# Patient Record
Sex: FEMALE | Race: OTHER | NOT HISPANIC OR LATINO
[De-identification: names, ages, dates, MRNs, and addresses within clinical notes are randomized per-mention and may not be internally consistent; named-entity substitution may affect disease eponyms.]

---

## 2022-01-27 ENCOUNTER — RESULT REVIEW (OUTPATIENT)
Age: 27
End: 2022-01-27

## 2022-02-24 ENCOUNTER — NON-APPOINTMENT (OUTPATIENT)
Age: 27
End: 2022-02-24

## 2022-02-24 PROBLEM — Z00.00 ENCOUNTER FOR PREVENTIVE HEALTH EXAMINATION: Status: ACTIVE | Noted: 2022-02-24

## 2022-03-01 ENCOUNTER — NON-APPOINTMENT (OUTPATIENT)
Age: 27
End: 2022-03-01

## 2022-03-01 ENCOUNTER — APPOINTMENT (OUTPATIENT)
Dept: OBGYN | Facility: CLINIC | Age: 27
End: 2022-03-01
Payer: COMMERCIAL

## 2022-03-01 VITALS — TEMPERATURE: 98 F | WEIGHT: 132 LBS | BODY MASS INDEX: 22.53 KG/M2 | HEIGHT: 64 IN

## 2022-03-01 PROCEDURE — 99203 OFFICE O/P NEW LOW 30 MIN: CPT | Mod: 25

## 2022-03-01 PROCEDURE — 76830 TRANSVAGINAL US NON-OB: CPT

## 2022-03-02 LAB
BILIRUB UR QL STRIP: NEGATIVE
GLUCOSE UR-MCNC: NEGATIVE
HCG UR QL: 0.2 EU/DL
HGB UR QL STRIP.AUTO: NEGATIVE
KETONES UR-MCNC: 15
LEUKOCYTE ESTERASE UR QL STRIP: NORMAL
NITRITE UR QL STRIP: NEGATIVE
PH UR STRIP: 5.5
PROT UR STRIP-MCNC: NORMAL
SP GR UR STRIP: 1.03

## 2022-03-03 ENCOUNTER — LABORATORY RESULT (OUTPATIENT)
Age: 27
End: 2022-03-03

## 2022-03-08 RX ORDER — PROGESTERONE 100 MG/1
100 CAPSULE ORAL AT BEDTIME
Qty: 30 | Refills: 2 | Status: ACTIVE | COMMUNITY
Start: 2022-03-08 | End: 1900-01-01

## 2022-03-10 ENCOUNTER — NON-APPOINTMENT (OUTPATIENT)
Age: 27
End: 2022-03-10

## 2022-03-14 ENCOUNTER — APPOINTMENT (OUTPATIENT)
Dept: OBGYN | Facility: CLINIC | Age: 27
End: 2022-03-14
Payer: COMMERCIAL

## 2022-03-14 ENCOUNTER — NON-APPOINTMENT (OUTPATIENT)
Age: 27
End: 2022-03-14

## 2022-03-14 VITALS — BODY MASS INDEX: 22.71 KG/M2 | WEIGHT: 133 LBS | TEMPERATURE: 98 F | HEIGHT: 64 IN

## 2022-03-14 DIAGNOSIS — Z78.9 OTHER SPECIFIED HEALTH STATUS: ICD-10-CM

## 2022-03-14 DIAGNOSIS — N91.1 SECONDARY AMENORRHEA: ICD-10-CM

## 2022-03-14 LAB
BILIRUB UR QL STRIP: NEGATIVE
GLUCOSE UR-MCNC: NEGATIVE
HCG UR QL: 0.2 EU/DL
HGB UR QL STRIP.AUTO: NEGATIVE
KETONES UR-MCNC: NEGATIVE
LEUKOCYTE ESTERASE UR QL STRIP: NEGATIVE
NITRITE UR QL STRIP: NEGATIVE
PH UR STRIP: 7
PROT UR STRIP-MCNC: NEGATIVE
SP GR UR STRIP: 1.02

## 2022-03-14 PROCEDURE — 0502F SUBSEQUENT PRENATAL CARE: CPT

## 2022-03-14 RX ORDER — PRENATAL VIT 49/IRON FUM/FOLIC 6.75-0.2MG
TABLET ORAL
Refills: 0 | Status: ACTIVE | COMMUNITY

## 2022-03-28 ENCOUNTER — APPOINTMENT (OUTPATIENT)
Dept: ANTEPARTUM | Facility: CLINIC | Age: 27
End: 2022-03-28
Payer: COMMERCIAL

## 2022-03-28 ENCOUNTER — ASOB RESULT (OUTPATIENT)
Age: 27
End: 2022-03-28

## 2022-03-28 ENCOUNTER — RESULT CHARGE (OUTPATIENT)
Age: 27
End: 2022-03-28

## 2022-03-28 ENCOUNTER — OUTPATIENT (OUTPATIENT)
Dept: OUTPATIENT SERVICES | Facility: HOSPITAL | Age: 27
LOS: 1 days | Discharge: HOME | End: 2022-03-28

## 2022-03-28 VITALS
HEART RATE: 61 BPM | SYSTOLIC BLOOD PRESSURE: 119 MMHG | TEMPERATURE: 98.2 F | DIASTOLIC BLOOD PRESSURE: 61 MMHG | BODY MASS INDEX: 22.2 KG/M2 | WEIGHT: 130 LBS | HEIGHT: 64 IN

## 2022-03-28 PROCEDURE — 76813 OB US NUCHAL MEAS 1 GEST: CPT | Mod: 26

## 2022-03-28 PROCEDURE — 99214 OFFICE O/P EST MOD 30 MIN: CPT | Mod: 25

## 2022-03-28 PROCEDURE — 76814 OB US NUCHAL MEAS ADD-ON: CPT | Mod: 26

## 2022-03-28 PROCEDURE — ZZZZZ: CPT

## 2022-03-28 NOTE — PHYSICAL EXAM
[FreeTextEntry1] : General: In no apparent distress. \par \par HEENT:  Atraumatic.  Extraocular muscles intact.  \par \par Cardiovascular: Regular rate and rhythm, normal S1/S2 \par \par Pulmonary: Clear to auscultation bilaterally.  No wheezing. \par \par Abdomen: soft, gravid, nontender, nondistended, no rebound, no guarding \par \par Extremities: no calf tenderness or edema \par \par Neurology: +2 reflexes in bilateral lower extremities \par \par Psychiatry:  Happy mood.  Awake, alert, oriented to person, place, time.

## 2022-03-28 NOTE — HISTORY OF PRESENT ILLNESS
[FreeTextEntry1] : 28yo  at 11w0d, LAURITA 10/17/2022, by LMP 1/10/22 c/w early ultrasound, di-di twin gestation, naturally conceived, here for initial MFM consult. Referred by Dr. Rose\par \par Patient reports feeling well today. Denies cramping or bleeding. \par Reports nausea, no vomiting. She has lost 1kg in 2 weeks. Denies fever, chlls,cough, chest pain, headaches, dysuria. \par \par All prenatal labs reviewed: \par Opos\par Measles NONimmune\par Mumps/Rubella Immune\par Varicella immune\par Lead neg\par HCV neg\par HBsAg NR\par HIV neg\par RPR NR\par \par No genetic screening results. Patient has requisition for NIPT, which she was instructed to have drawn after 11 weeks to optimize fetal fraction.

## 2022-03-28 NOTE — DISCUSSION/SUMMARY
[FreeTextEntry1] : MFM Att'g and PGY-3 f/u Consult Note: \par 26yo  at 11w0d, LAURITA 10/17/2022, by LMP 1/10/22 c/w early ultrasound, di-di twin gestation, naturally conceived, here for NT screen and initial MFM consult. Referred by Dr. Rose\par \par 1. Di-di twin gestation:  -NT normal x 2 today\par -->Delivery at early term unless otherwise indicated or other comorbidities arise\par -->MSAFP at 16-20w\par -->Anatomic survey at 20-22w EGA\par -->f/u growth q month.\par -->Fe bid with meals for incr nutritional req'ts with multiple gestation. Ducosate prn.\par \par 2. Nausea/vomitinkg weight loss likely due to 1st tri decr appetite.\par -->continue eating small and frequent meals \par -->avoid fried, heavy foods\par -->may take Vitamin B6 for prevention of nausea.  Add doxylamine as needed. \par \par 3. Pregnancy\par -Prenatal care is with Dr. Rose\par -Continue PNVs and Folic acid\par -Miscarriage precautions discussed\par -We discussed NIPT screen for aneuploidy in comparison with Sequential screening. Pt and  understand advantages of both, and that this is an important decision to make with their primary obstetrician. \par \par RT 1mo for interval growth.\par       2mo for anatomy sonogram and repeat MFM visit.\par \par Thank you for allowing us to be part of Ms Bermudez's pregnancy care team.  kpb

## 2022-03-29 ENCOUNTER — NON-APPOINTMENT (OUTPATIENT)
Age: 27
End: 2022-03-29

## 2022-03-30 DIAGNOSIS — O30.049 TWIN PREGNANCY, DICHORIONIC/DIAMNIOTIC, UNSPECIFIED TRIMESTER: ICD-10-CM

## 2022-03-30 DIAGNOSIS — Z36.82 ENCOUNTER FOR ANTENATAL SCREENING FOR NUCHAL TRANSLUCENCY: ICD-10-CM

## 2022-03-30 DIAGNOSIS — Z3A.11 11 WEEKS GESTATION OF PREGNANCY: ICD-10-CM

## 2022-04-04 ENCOUNTER — APPOINTMENT (OUTPATIENT)
Dept: OBGYN | Facility: CLINIC | Age: 27
End: 2022-04-04

## 2022-04-11 ENCOUNTER — TRANSCRIPTION ENCOUNTER (OUTPATIENT)
Age: 27
End: 2022-04-11

## 2022-04-13 LAB
BILIRUB UR QL STRIP: NORMAL
BP DIAS: 61 MM HG
BP SYS: 119 MM HG
CLARITY UR: CLEAR
COLLECTION METHOD: NORMAL
GLUCOSE UR-MCNC: NORMAL
HCG UR QL: 0.2 EU/DL
HGB UR QL STRIP.AUTO: NORMAL
KETONES UR-MCNC: NORMAL
LEUKOCYTE ESTERASE UR QL STRIP: NORMAL
NITRITE UR QL STRIP: NORMAL
OB COMMENTS: NORMAL
PH UR STRIP: 7
PROT UR STRIP-MCNC: NORMAL
SCHEDULED VISIT: YES
SP GR UR STRIP: 1.01
URINE ALBUMIN/PROTEIN: NORMAL
URINE GLUCOSE: NORMAL
URINE KETONES: NORMAL
WEEKS GESTATION: 11

## 2022-05-04 ENCOUNTER — OUTPATIENT (OUTPATIENT)
Dept: OUTPATIENT SERVICES | Facility: HOSPITAL | Age: 27
LOS: 1 days | Discharge: HOME | End: 2022-05-04
Payer: COMMERCIAL

## 2022-05-04 DIAGNOSIS — N64.4 MASTODYNIA: ICD-10-CM

## 2022-05-04 DIAGNOSIS — N63.10 UNSPECIFIED LUMP IN THE RIGHT BREAST, UNSPECIFIED QUADRANT: ICD-10-CM

## 2022-05-04 PROCEDURE — 76642 ULTRASOUND BREAST LIMITED: CPT | Mod: 26,RT

## 2022-05-09 ENCOUNTER — OUTPATIENT (OUTPATIENT)
Dept: OUTPATIENT SERVICES | Facility: HOSPITAL | Age: 27
LOS: 1 days | Discharge: HOME | End: 2022-05-09

## 2022-05-09 ENCOUNTER — ASOB RESULT (OUTPATIENT)
Age: 27
End: 2022-05-09

## 2022-05-09 ENCOUNTER — APPOINTMENT (OUTPATIENT)
Dept: ANTEPARTUM | Facility: CLINIC | Age: 27
End: 2022-05-09
Payer: COMMERCIAL

## 2022-05-09 PROCEDURE — 76816 OB US FOLLOW-UP PER FETUS: CPT | Mod: 26

## 2022-05-11 ENCOUNTER — NON-APPOINTMENT (OUTPATIENT)
Age: 27
End: 2022-05-11

## 2022-06-02 ENCOUNTER — OUTPATIENT (OUTPATIENT)
Dept: OUTPATIENT SERVICES | Facility: HOSPITAL | Age: 27
LOS: 1 days | Discharge: HOME | End: 2022-06-02

## 2022-06-02 ENCOUNTER — APPOINTMENT (OUTPATIENT)
Dept: ANTEPARTUM | Facility: CLINIC | Age: 27
End: 2022-06-02
Payer: COMMERCIAL

## 2022-06-02 ENCOUNTER — ASOB RESULT (OUTPATIENT)
Age: 27
End: 2022-06-02

## 2022-06-02 PROCEDURE — 76812 OB US DETAILED ADDL FETUS: CPT | Mod: 26

## 2022-06-02 PROCEDURE — 76811 OB US DETAILED SNGL FETUS: CPT | Mod: 26

## 2022-06-03 ENCOUNTER — NON-APPOINTMENT (OUTPATIENT)
Age: 27
End: 2022-06-03

## 2022-06-16 ENCOUNTER — ASOB RESULT (OUTPATIENT)
Age: 27
End: 2022-06-16

## 2022-06-16 ENCOUNTER — APPOINTMENT (OUTPATIENT)
Dept: ANTEPARTUM | Facility: CLINIC | Age: 27
End: 2022-06-16
Payer: COMMERCIAL

## 2022-06-16 ENCOUNTER — OUTPATIENT (OUTPATIENT)
Dept: OUTPATIENT SERVICES | Facility: HOSPITAL | Age: 27
LOS: 1 days | Discharge: HOME | End: 2022-06-16

## 2022-06-16 DIAGNOSIS — Z3A.22 22 WEEKS GESTATION OF PREGNANCY: ICD-10-CM

## 2022-06-16 DIAGNOSIS — Z36.3 ENCOUNTER FOR ANTENATAL SCREENING FOR MALFORMATIONS: ICD-10-CM

## 2022-06-16 DIAGNOSIS — O30.042 TWIN PREGNANCY, DICHORIONIC/DIAMNIOTIC, SECOND TRIMESTER: ICD-10-CM

## 2022-06-16 PROCEDURE — ZZZZZ: CPT

## 2022-06-16 PROCEDURE — 76816 OB US FOLLOW-UP PER FETUS: CPT | Mod: 26

## 2022-06-16 PROCEDURE — 76816 OB US FOLLOW-UP PER FETUS: CPT | Mod: 26,59

## 2022-07-13 ENCOUNTER — NON-APPOINTMENT (OUTPATIENT)
Age: 27
End: 2022-07-13

## 2022-07-14 ENCOUNTER — OUTPATIENT (OUTPATIENT)
Dept: OUTPATIENT SERVICES | Facility: HOSPITAL | Age: 27
LOS: 1 days | Discharge: HOME | End: 2022-07-14

## 2022-07-14 ENCOUNTER — ASOB RESULT (OUTPATIENT)
Age: 27
End: 2022-07-14

## 2022-07-14 ENCOUNTER — APPOINTMENT (OUTPATIENT)
Dept: ANTEPARTUM | Facility: CLINIC | Age: 27
End: 2022-07-14

## 2022-07-14 DIAGNOSIS — O30.009 TWIN PREGNANCY, UNSPECIFIED NUMBER OF PLACENTA AND UNSPECIFIED NUMBER OF AMNIOTIC SACS, UNSPECIFIED TRIMESTER: ICD-10-CM

## 2022-07-14 PROCEDURE — 76816 OB US FOLLOW-UP PER FETUS: CPT | Mod: 26,59

## 2022-07-14 PROCEDURE — 76816 OB US FOLLOW-UP PER FETUS: CPT | Mod: 26

## 2022-07-15 DIAGNOSIS — O30.009 TWIN PREGNANCY, UNSPECIFIED NUMBER OF PLACENTA AND UNSPECIFIED NUMBER OF AMNIOTIC SACS, UNSPECIFIED TRIMESTER: ICD-10-CM

## 2022-07-15 DIAGNOSIS — Z36.89 ENCOUNTER FOR OTHER SPECIFIED ANTENATAL SCREENING: ICD-10-CM

## 2022-07-15 DIAGNOSIS — Z3A.26 26 WEEKS GESTATION OF PREGNANCY: ICD-10-CM

## 2022-07-18 ENCOUNTER — NON-APPOINTMENT (OUTPATIENT)
Age: 27
End: 2022-07-18

## 2022-08-01 ENCOUNTER — APPOINTMENT (OUTPATIENT)
Dept: OBGYN | Facility: CLINIC | Age: 27
End: 2022-08-01

## 2022-08-01 ENCOUNTER — OUTPATIENT (OUTPATIENT)
Dept: OUTPATIENT SERVICES | Facility: HOSPITAL | Age: 27
LOS: 1 days | Discharge: HOME | End: 2022-08-01

## 2022-08-01 ENCOUNTER — APPOINTMENT (OUTPATIENT)
Dept: ANTEPARTUM | Facility: CLINIC | Age: 27
End: 2022-08-01

## 2022-08-01 ENCOUNTER — NON-APPOINTMENT (OUTPATIENT)
Age: 27
End: 2022-08-01

## 2022-08-01 ENCOUNTER — RESULT CHARGE (OUTPATIENT)
Age: 27
End: 2022-08-01

## 2022-08-01 VITALS
WEIGHT: 152 LBS | HEART RATE: 93 BPM | SYSTOLIC BLOOD PRESSURE: 123 MMHG | OXYGEN SATURATION: 98 % | DIASTOLIC BLOOD PRESSURE: 62 MMHG | BODY MASS INDEX: 25.95 KG/M2 | HEIGHT: 64 IN

## 2022-08-01 LAB
ADDL FETAL HEART RATE: 151
BILIRUB UR QL STRIP: NORMAL
BP DIAS: 62 MM HG
BP SYS: 123 MM HG
CLARITY UR: CLEAR
COLLECTION METHOD: NORMAL
FETAL HEART RATE (BPM): 129
FETAL MOVEMENT: PRESENT
GLUCOSE BLDC GLUCOMTR-MCNC: 134
GLUCOSE BLDC GLUCOMTR-MCNC: 134 MG/DL — HIGH (ref 70–99)
GLUCOSE UR-MCNC: NORMAL
HCG UR QL: 0.2 EU/DL
HGB UR QL STRIP.AUTO: NORMAL
KETONES UR-MCNC: NORMAL
LEUKOCYTE ESTERASE UR QL STRIP: NORMAL
NITRITE UR QL STRIP: NORMAL
OB COMMENTS: NORMAL
PH UR STRIP: 6
PROT UR STRIP-MCNC: NORMAL
SCHEDULED VISIT: YES
SP GR UR STRIP: 1.01
URINE ALBUMIN/PROTEIN: NORMAL
URINE GLUCOSE: NORMAL
URINE KETONES: NORMAL
WEEKS GESTATION: 29

## 2022-08-01 PROCEDURE — 99214 OFFICE O/P EST MOD 30 MIN: CPT

## 2022-08-01 NOTE — END OF VISIT
[FreeTextEntry3] : [facility] Staff\par \par I saw and evaluated Ms. GARCIA with Dr. Gonsalez.\par \par Gestational diabetes.  She has already been checking her fingersticks.\par Fastin-91\par 2hr breakfast:  (2 out of 18 values elevated)\par 2hr lunch:  (10 out of 18 values elevated, she eats rice for lunch)\par 2hr dinner:  (2 out of 18 values elevated)\par \par Counseling: \par \par 1.  The patient will be evaluated by a nutritionist and instructed in adhering to a diabetic diet.\par \par 2.  She is familiar with capillary blood glucose testing (fasting and 2 hours after each meal).\par \par 3.  The significance and usual management of diabetes in pregnancy were reviewed, including risks of preeclampsia, Intra-Uterine Fetal Demise, macrosomia, birth trauma, pre-term labor,  section, NICU admission (the main reasons include  hypoglycemia and  jaundice) and the possible need for insulin therapy.\par \par 4.  Exercise was encouraged.  Ideally, she should walk briskly  after each meal.\par \par Recommendations:\par \par 1.  Glycemic Control.  Target glucose ranges to minimize excessive fetal growth are:  Fasting 60-90 mg/dl; preprandial  mg/dl; and 2-hour postprandial < 120 mg/dl.  If these values are elevated, insulin therapy is encouraged.\par \par 2.  Antepartum testing.  Daily fetal movement counts are recommended from 28 weeks.  Weekly or twice weekly biophysical profiles may be recommended, the frequency and timing will depend on glucose control.  Ultrasound assessment of fetal growth and macrosomic features is recommended.\par \par 3.  Timing of Delivery.  Given the twin gestation we recommend delivery by around 38 weeks gestation, earlier if indicated.\par \par 4.  Route of delivery.  Diabetes is associated with about a six-fold risk for shoulder dystocia (which includes the risk of irreversible nerve, bone, and brain injury).  Operative vaginal deliveries should be approached with caution if at all.\par \par 5.  Glucose control in labor.  During labor, capillary glucose values should be checked every few hours (depending on their stability).  Insulin may be required to cover elevated glucose levels.\par \par 6.  Long-term diabetes surveillance.  The risk of developing diabetes outside of pregnancy over the next five years may be as high as 50%.  I recommend that she have a 2 hour GTT or similar diabetes screen  at 6 to 12 weeks postpartum with her primary Obstetrician and counseling about ways to minimize her risk.  If her fasting glucose is normal, annual glucose screening by her primary care physician is advised.\par \par Dichorionic diamniotic twin gestation\par - Estimated fetal weights monthly.\par - recommend delivery by 38 weeks gestation.  Mode of delivery to be determined by the primary Obstetrician.\par \par Prenatal care is with Dr. Kate.\par Fetal movement and labor precautions discussed.\par Estimated fetal weight monthly\par Weekly biophysical profiles to start at around 37 weeks gestation.\par Follow up in 2 weeks with the fingerstick log.\par \par Sridhar German MD\par \par

## 2022-08-01 NOTE — DISCUSSION/SUMMARY
[FreeTextEntry1] : 28yo  at 29w0d, LAURITA 10/17/2022, by LMP 1/10/22 c/w early ultrasound, di-di twin gestation, naturally conceived, here for f/u MFM visit for di-di twins and GDM. Comanaged with Dr.Al Joan Beverly. \par \par #GDM\par -FS today 134 (1hr after banana, chocolate)\par - FS log reviewed:\par Fastin-91\par 2hr breakfast:  (2 out of 18 values elevated)\par 2hr lunch:  (10 out of 18 values elevated)\par 2hr dinner:  (2 out of 18 values elevated)\par - Reports elevated lunch values after eating white rice. Discussed diabetic diet modifications with trying brown rice or smaller portions. \par - diabetic teaching and nutrition counseling \par - Discussed risks of GDM including but not limited to: macrosomia, shoulder dystocia, increased risk of , stillbirth, NICU admission for hypoglycemia and jaundice, and preeclampsia. Explained that if sugars are well controlled the above complications decrease. \par - FS goal fasting <95, 2 hour PP<120. Encouraged to document FS. \par - Discussed increased risk of type II DM later in life and that we will screen for that after pregnancy. \par - weekly BPP at 32w\par \par 1. Di-di twin gestation:  \par -NT normal x 2 \par - Twin A: MVP 7.83cm, EFW 914g (32%), FW discordancy 13%, MVP 5.47cm, EFW 1046g (72%), there is uterine synechia noted not involving either baby\par -->Delivery at early term unless otherwise indicated or other comorbidities arise \par -->f/u growth q month. \par -->From previous note: Fe bid with meals for incr nutritional req'ts with multiple gestation. Ducosate prn. \par \par 2. Pregnancy \par -Prenatal care is with Dr. Hairston \par -Continue PNVs and Folic acid \par -Miscarriage precautions discussed  \par - NIPT low risk per patient\par - AFP not done\par \par RTC in 2 w with FS log

## 2022-08-01 NOTE — HISTORY OF PRESENT ILLNESS
[FreeTextEntry1] : 28yo  at 29w0d, LAURITA 10/17/2022, by LMP 1/10/22 c/w early ultrasound, di-di twin gestation, naturally conceived, here for f/u MFM visit for di-di twins and GDM. Comanaged by . \par \par Patient denies contractions, vaginal bleeding, leakage of fluid. Reports good fetal movement x2. \par \par Now with GDM. ; GTT 79/182/182/162 \par \par FS log reviewed:\par Fastin-91\par 2hr breakfast:  (2 out of 18 values elevated)\par 2hr lunch:  (10 out of 18 values elevated)\par 2hr dinner:  (2 out of 18 values elevated)\par \par All prenatal labs reviewed:  \par Opos \par Measles NONimmune \par Mumps/Rubella Immune \par Varicella immune \par Lead neg \par HCV neg \par HBsAg NR \par HIV neg \par RPR NR \par NIPT low risk (per patient, results no in Allscripts)

## 2022-08-02 DIAGNOSIS — O24.410 GESTATIONAL DIABETES MELLITUS IN PREGNANCY, DIET CONTROLLED: ICD-10-CM

## 2022-08-02 DIAGNOSIS — O30.043 TWIN PREGNANCY, DICHORIONIC/DIAMNIOTIC, THIRD TRIMESTER: ICD-10-CM

## 2022-08-02 DIAGNOSIS — Z3A.29 29 WEEKS GESTATION OF PREGNANCY: ICD-10-CM

## 2022-08-11 ENCOUNTER — OUTPATIENT (OUTPATIENT)
Dept: OUTPATIENT SERVICES | Facility: HOSPITAL | Age: 27
LOS: 1 days | Discharge: HOME | End: 2022-08-11

## 2022-08-11 ENCOUNTER — ASOB RESULT (OUTPATIENT)
Age: 27
End: 2022-08-11

## 2022-08-11 ENCOUNTER — APPOINTMENT (OUTPATIENT)
Dept: ANTEPARTUM | Facility: CLINIC | Age: 27
End: 2022-08-11

## 2022-08-11 ENCOUNTER — RESULT CHARGE (OUTPATIENT)
Age: 27
End: 2022-08-11

## 2022-08-11 VITALS
OXYGEN SATURATION: 99 % | SYSTOLIC BLOOD PRESSURE: 113 MMHG | TEMPERATURE: 98.4 F | DIASTOLIC BLOOD PRESSURE: 59 MMHG | BODY MASS INDEX: 26.29 KG/M2 | HEART RATE: 67 BPM | WEIGHT: 154 LBS | HEIGHT: 64 IN

## 2022-08-11 LAB — GLUCOSE BLDC GLUCOMTR-MCNC: 96 MG/DL — SIGNIFICANT CHANGE UP (ref 70–99)

## 2022-08-11 PROCEDURE — 76819 FETAL BIOPHYS PROFIL W/O NST: CPT | Mod: 26,59

## 2022-08-11 PROCEDURE — 76816 OB US FOLLOW-UP PER FETUS: CPT | Mod: 26,59

## 2022-08-11 PROCEDURE — 76820 UMBILICAL ARTERY ECHO: CPT | Mod: 26,59

## 2022-08-11 PROCEDURE — 99214 OFFICE O/P EST MOD 30 MIN: CPT | Mod: 25

## 2022-08-11 PROCEDURE — 76819 FETAL BIOPHYS PROFIL W/O NST: CPT | Mod: 26

## 2022-08-11 PROCEDURE — 76816 OB US FOLLOW-UP PER FETUS: CPT | Mod: 26

## 2022-08-15 ENCOUNTER — ASOB RESULT (OUTPATIENT)
Age: 27
End: 2022-08-15

## 2022-08-15 ENCOUNTER — APPOINTMENT (OUTPATIENT)
Dept: ANTEPARTUM | Facility: CLINIC | Age: 27
End: 2022-08-15

## 2022-08-15 ENCOUNTER — OUTPATIENT (OUTPATIENT)
Dept: OUTPATIENT SERVICES | Facility: HOSPITAL | Age: 27
LOS: 1 days | Discharge: HOME | End: 2022-08-15

## 2022-08-15 DIAGNOSIS — O26.843 UTERINE SIZE-DATE DISCREPANCY, THIRD TRIMESTER: ICD-10-CM

## 2022-08-15 DIAGNOSIS — O36.5990 MATERNAL CARE FOR OTHER KNOWN OR SUSPECTED POOR FETAL GROWTH, UNSPECIFIED TRIMESTER, NOT APPLICABLE OR UNSPECIFIED: ICD-10-CM

## 2022-08-15 DIAGNOSIS — O30.009 TWIN PREGNANCY, UNSPECIFIED NUMBER OF PLACENTA AND UNSPECIFIED NUMBER OF AMNIOTIC SACS, UNSPECIFIED TRIMESTER: ICD-10-CM

## 2022-08-15 DIAGNOSIS — O30.043 TWIN PREGNANCY, DICHORIONIC/DIAMNIOTIC, THIRD TRIMESTER: ICD-10-CM

## 2022-08-15 PROCEDURE — 76819 FETAL BIOPHYS PROFIL W/O NST: CPT | Mod: 26

## 2022-08-15 PROCEDURE — 76820 UMBILICAL ARTERY ECHO: CPT | Mod: 26

## 2022-08-17 DIAGNOSIS — O30.043 TWIN PREGNANCY, DICHORIONIC/DIAMNIOTIC, THIRD TRIMESTER: ICD-10-CM

## 2022-08-17 DIAGNOSIS — Z3A.31 31 WEEKS GESTATION OF PREGNANCY: ICD-10-CM

## 2022-08-17 DIAGNOSIS — O36.5990 MATERNAL CARE FOR OTHER KNOWN OR SUSPECTED POOR FETAL GROWTH, UNSPECIFIED TRIMESTER, NOT APPLICABLE OR UNSPECIFIED: ICD-10-CM

## 2022-08-22 ENCOUNTER — APPOINTMENT (OUTPATIENT)
Dept: ANTEPARTUM | Facility: CLINIC | Age: 27
End: 2022-08-22

## 2022-08-22 ENCOUNTER — OUTPATIENT (OUTPATIENT)
Dept: OUTPATIENT SERVICES | Facility: HOSPITAL | Age: 27
LOS: 1 days | Discharge: HOME | End: 2022-08-22

## 2022-08-22 ENCOUNTER — ASOB RESULT (OUTPATIENT)
Age: 27
End: 2022-08-22

## 2022-08-22 PROCEDURE — 76819 FETAL BIOPHYS PROFIL W/O NST: CPT | Mod: 26

## 2022-08-22 PROCEDURE — 76820 UMBILICAL ARTERY ECHO: CPT | Mod: 26,59

## 2022-08-22 PROCEDURE — 76819 FETAL BIOPHYS PROFIL W/O NST: CPT | Mod: 26,59

## 2022-08-23 DIAGNOSIS — O24.410 GESTATIONAL DIABETES MELLITUS IN PREGNANCY, DIET CONTROLLED: ICD-10-CM

## 2022-08-23 DIAGNOSIS — O30.049 TWIN PREGNANCY, DICHORIONIC/DIAMNIOTIC, UNSPECIFIED TRIMESTER: ICD-10-CM

## 2022-08-23 DIAGNOSIS — Z3A.32 32 WEEKS GESTATION OF PREGNANCY: ICD-10-CM

## 2022-08-24 NOTE — DISCUSSION/SUMMARY
[FreeTextEntry1] : 26yo  at 30w3d, LAURITA 10/17/2022, by LMP 1/10/22 c/w early ultrasound, di-di twin gestation, naturally conceived, here for f/u MFM visit for di-di twins and GDM. Comanaged with Dr.Al Joan Beverly. \par \par #GDM\par -FS today 96 (2hr after banana, tea w/ sugar)\par - FS log reviewed:\par *Fastin-97 (1 out of 27 values elevated)\par *2hr PP:  (13 out of 78 values elevated)\par - Improved FS values from last visit, well controlled with dietary changes and ambulation after meals.  \par - S/p diabetic teaching and nutrition counseling \par - Previously discussed risks of GDM including but not limited to: macrosomia, shoulder dystocia, increased risk of , stillbirth, NICU admission for hypoglycemia and jaundice, and preeclampsia. Explained that if sugars are well controlled the above complications decrease. \par - FS goal fasting <95, 2 hour PP<120. Encouraged to document FS. \par - Previously discussed increased risk of type II DM later in life and that we will screen for that after pregnancy. \par \par 1. Di-di twin gestation with IUGR: \par -NT normal x 2 \par - F/u sonogram report today, FW discordancy 20%, AC<10%ile for one twin, umbilical artery dopplers wnl\par - Recommend BPP weekly\par -->f/u growth q month. \par -->From previous note: Fe bid with meals for incr nutritional req'ts with multiple gestation. Ducosate prn. \par \par 2. Pregnancy \par -Prenatal care is with Dr. Hairston \par -Continue PNVs and Folic acid \par - PTL precautions discussed \par - NIPT low risk per patient\par - AFP not done\par \par RTC in 4w with FS log

## 2022-08-24 NOTE — HISTORY OF PRESENT ILLNESS
[FreeTextEntry1] : 28yo  at 30w3d, LAURITA 10/17/2022, by LMP 1/10/22 c/w early ultrasound, di-di twin gestation, naturally conceived, here for f/u MFM visit for di-di twins and GDM. Comanaged by . \par \par Patient denies contractions, vaginal bleeding, leakage of fluid. Reports good fetal movement x2. \par \par FS log reviewed:\par *Fastin-97 (1 out of 27 values elevated)\par *2hr PP:  (13 out of 78 values elevated)\par \par All prenatal labs reviewed: \par ; GTT 79/182/182/162 \par Opos \par Measles NONimmune \par Mumps/Rubella Immune \par Varicella immune \par Lead neg \par HCV neg \par HBsAg NR \par HIV neg \par RPR NR \par NIPT low risk (per patient, results not in Allscripts) \par  \par

## 2022-08-29 ENCOUNTER — APPOINTMENT (OUTPATIENT)
Dept: ANTEPARTUM | Facility: CLINIC | Age: 27
End: 2022-08-29

## 2022-08-29 ENCOUNTER — OUTPATIENT (OUTPATIENT)
Dept: OUTPATIENT SERVICES | Facility: HOSPITAL | Age: 27
LOS: 1 days | Discharge: HOME | End: 2022-08-29

## 2022-08-29 ENCOUNTER — ASOB RESULT (OUTPATIENT)
Age: 27
End: 2022-08-29

## 2022-08-29 PROCEDURE — 76819 FETAL BIOPHYS PROFIL W/O NST: CPT | Mod: 26,59

## 2022-08-29 PROCEDURE — 76820 UMBILICAL ARTERY ECHO: CPT | Mod: 26

## 2022-08-29 PROCEDURE — 76820 UMBILICAL ARTERY ECHO: CPT | Mod: 26,59

## 2022-08-29 PROCEDURE — 76819 FETAL BIOPHYS PROFIL W/O NST: CPT | Mod: 26

## 2022-08-31 DIAGNOSIS — Z3A.33 33 WEEKS GESTATION OF PREGNANCY: ICD-10-CM

## 2022-08-31 DIAGNOSIS — O36.5931 MATERNAL CARE FOR OTHER KNOWN OR SUSPECTED POOR FETAL GROWTH, THIRD TRIMESTER, FETUS 1: ICD-10-CM

## 2022-08-31 DIAGNOSIS — O24.410 GESTATIONAL DIABETES MELLITUS IN PREGNANCY, DIET CONTROLLED: ICD-10-CM

## 2022-08-31 DIAGNOSIS — O30.043 TWIN PREGNANCY, DICHORIONIC/DIAMNIOTIC, THIRD TRIMESTER: ICD-10-CM

## 2022-09-01 ENCOUNTER — ASOB RESULT (OUTPATIENT)
Age: 27
End: 2022-09-01

## 2022-09-01 ENCOUNTER — OUTPATIENT (OUTPATIENT)
Dept: OUTPATIENT SERVICES | Facility: HOSPITAL | Age: 27
LOS: 1 days | Discharge: HOME | End: 2022-09-01

## 2022-09-01 ENCOUNTER — APPOINTMENT (OUTPATIENT)
Dept: ANTEPARTUM | Facility: CLINIC | Age: 27
End: 2022-09-01

## 2022-09-01 PROCEDURE — 76820 UMBILICAL ARTERY ECHO: CPT | Mod: 26

## 2022-09-01 PROCEDURE — 99213 OFFICE O/P EST LOW 20 MIN: CPT | Mod: 25

## 2022-09-01 PROCEDURE — 76819 FETAL BIOPHYS PROFIL W/O NST: CPT | Mod: 26

## 2022-09-02 ENCOUNTER — NON-APPOINTMENT (OUTPATIENT)
Age: 27
End: 2022-09-02

## 2022-09-02 DIAGNOSIS — O24.419 GESTATIONAL DIABETES MELLITUS IN PREGNANCY, UNSPECIFIED CONTROL: ICD-10-CM

## 2022-09-02 DIAGNOSIS — Z3A.33 33 WEEKS GESTATION OF PREGNANCY: ICD-10-CM

## 2022-09-02 DIAGNOSIS — O30.043 TWIN PREGNANCY, DICHORIONIC/DIAMNIOTIC, THIRD TRIMESTER: ICD-10-CM

## 2022-09-02 DIAGNOSIS — O36.5931 MATERNAL CARE FOR OTHER KNOWN OR SUSPECTED POOR FETAL GROWTH, THIRD TRIMESTER, FETUS 1: ICD-10-CM

## 2022-09-06 ENCOUNTER — APPOINTMENT (OUTPATIENT)
Dept: ANTEPARTUM | Facility: CLINIC | Age: 27
End: 2022-09-06

## 2022-09-06 ENCOUNTER — OUTPATIENT (OUTPATIENT)
Dept: OUTPATIENT SERVICES | Facility: HOSPITAL | Age: 27
LOS: 1 days | Discharge: HOME | End: 2022-09-06

## 2022-09-06 ENCOUNTER — ASOB RESULT (OUTPATIENT)
Age: 27
End: 2022-09-06

## 2022-09-06 DIAGNOSIS — O24.410 GESTATIONAL DIABETES MELLITUS IN PREGNANCY, DIET CONTROLLED: ICD-10-CM

## 2022-09-06 DIAGNOSIS — Z3A.34 34 WEEKS GESTATION OF PREGNANCY: ICD-10-CM

## 2022-09-06 DIAGNOSIS — O30.043 TWIN PREGNANCY, DICHORIONIC/DIAMNIOTIC, THIRD TRIMESTER: ICD-10-CM

## 2022-09-06 DIAGNOSIS — O36.5930 MATERNAL CARE FOR OTHER KNOWN OR SUSPECTED POOR FETAL GROWTH, THIRD TRIMESTER, NOT APPLICABLE OR UNSPECIFIED: ICD-10-CM

## 2022-09-06 PROCEDURE — 76818 FETAL BIOPHYS PROFILE W/NST: CPT | Mod: 26

## 2022-09-06 PROCEDURE — 76820 UMBILICAL ARTERY ECHO: CPT | Mod: 26

## 2022-09-06 PROCEDURE — ZZZZZ: CPT

## 2022-09-07 ENCOUNTER — NON-APPOINTMENT (OUTPATIENT)
Age: 27
End: 2022-09-07

## 2022-09-08 ENCOUNTER — OUTPATIENT (OUTPATIENT)
Dept: OUTPATIENT SERVICES | Facility: HOSPITAL | Age: 27
LOS: 1 days | Discharge: HOME | End: 2022-09-08

## 2022-09-08 ENCOUNTER — ASOB RESULT (OUTPATIENT)
Age: 27
End: 2022-09-08

## 2022-09-08 ENCOUNTER — APPOINTMENT (OUTPATIENT)
Dept: ANTEPARTUM | Facility: CLINIC | Age: 27
End: 2022-09-08
Payer: COMMERCIAL

## 2022-09-08 ENCOUNTER — RESULT CHARGE (OUTPATIENT)
Age: 27
End: 2022-09-08

## 2022-09-08 VITALS
TEMPERATURE: 98.4 F | BODY MASS INDEX: 27.31 KG/M2 | OXYGEN SATURATION: 98 % | HEIGHT: 64 IN | HEART RATE: 102 BPM | DIASTOLIC BLOOD PRESSURE: 64 MMHG | SYSTOLIC BLOOD PRESSURE: 115 MMHG | WEIGHT: 160 LBS

## 2022-09-08 VITALS — HEART RATE: 88 BPM

## 2022-09-08 LAB
ADDL FETAL HEART RATE: 135
BILIRUB UR QL STRIP: NORMAL
BP DIAS: 64 MM HG
BP SYS: 115 MM HG
CLARITY UR: CLEAR
COLLECTION METHOD: NORMAL
FETAL HEART RATE (BPM): 132
FETAL MOVEMENT: PRESENT
GLUCOSE BLDC GLUCOMTR-MCNC: 104
GLUCOSE BLDC GLUCOMTR-MCNC: 104 MG/DL — HIGH (ref 70–99)
GLUCOSE UR-MCNC: NORMAL
HCG UR QL: 0.2 EU/DL
HGB UR QL STRIP.AUTO: NORMAL
KETONES UR-MCNC: NORMAL
LEUKOCYTE ESTERASE UR QL STRIP: NORMAL
NITRITE UR QL STRIP: NORMAL
OB COMMENTS: NORMAL
PH UR STRIP: 6.5
PROT UR STRIP-MCNC: NORMAL
SCHEDULED VISIT: YES
SP GR UR STRIP: 1.01
URINE ALBUMIN/PROTEIN: NORMAL
URINE GLUCOSE: NORMAL
URINE KETONES: 1.01
WEEKS GESTATION: 34.3

## 2022-09-08 PROCEDURE — 76820 UMBILICAL ARTERY ECHO: CPT | Mod: 26,59

## 2022-09-08 PROCEDURE — 76818 FETAL BIOPHYS PROFILE W/NST: CPT | Mod: 26,59

## 2022-09-08 PROCEDURE — 76818 FETAL BIOPHYS PROFILE W/NST: CPT | Mod: 26

## 2022-09-08 PROCEDURE — 99214 OFFICE O/P EST MOD 30 MIN: CPT | Mod: 25

## 2022-09-08 PROCEDURE — 76820 UMBILICAL ARTERY ECHO: CPT | Mod: 26,59,76

## 2022-09-08 NOTE — DISCUSSION/SUMMARY
[FreeTextEntry1] : 28yo  at 34w3d, LAURITA 10/17/2022, by LMP 1/10/22 c/w early ultrasound, di-di twin gestation, naturally conceived, here for f/u MFM visit for di-di twins with FGR and GDM. Comanaged by Dr. Hairston.  \par \par Patient denies contractions, vaginal bleeding, leakage of fluid. Reports good fetal movement x2.  \par \par FS log reviewed: \par *Fastin-85 (0 out of 24 values elevated) \par *2hr PP:  (17 out of 69 values elevated) \par \par LAURITA: 10/17/22 \par \par Complications \par GDMA1 \par Di-Di twin gestation \par FGR, elevated UA dopplers\par \par All prenatal labs reviewed:  \par ; GTT 79/182/182/162  \par Opos  \par Measles non-immune  \par Mumps/Rubella Immune  \par Varicella immune  \par Lead neg  \par HCV neg  \par HBsAg NR  \par HIV neg  \par RPR NR  \par NIPT low risk (per patient, results not in Allscripts)  \par \par OBHx: primigravid \par \par Meds: PNVs \par \par SocHx: denies \par \par Gen: AOx3, NAD\par Cardiac: RRR, S1S2, no m/r/g\par Pulm: CTA b/l\par Abd: soft, gravid, nontender, no palpable ctx\par Ext: no edema, nontender, no erythema or warmth\par  \par A/P: 28yo  at 34w3d, LAURITA 10/17/2022, by LMP 1/10/22 c/w early ultrasound, di-di twin gestation, naturally conceived, here for f/u MFM visit for di-di twins with FGR and GDM. Comanaged by .  \par \par  #GDM \par \par -FS today: 104 (2h PP) \par FS log reviewed: \par *Fastin-85 (0 out of 24 values elevated) \par *2hr PP:  (17 out of 69 values elevated)\par \par - FS values well controlled with dietary changes and ambulation after meals.  \par - S/p diabetic teaching and nutrition counseling  \par - Previously discussed risks of GDM including but not limited to: macrosomia, shoulder dystocia, increased risk of , stillbirth, NICU admission for hypoglycemia and jaundice, and preeclampsia. Explained that if sugars are well controlled the above complications decrease.  \par - FS goal fasting <95, 2 hour PP<120. Encouraged to document FS.  \par - Previously discussed increased risk of type II DM later in life and that we will screen for that after pregnancy.  \par \par #Di-di twin gestation with FGR:  \par -NT normal x 2  \par - 33w3d: Twin A: BPP 8/8, 1691g (2%), MVP 3.62cm, posterior placenta, UA dopplers wnl; Twin B: BPP 8/8, MVP 4.5cm, 2115g (31%) posterior placenta, UA dopplers wnl; FW discordancy 20% \par -34w1d: Twin A: BPP 10/10, MVP 3.46cm, posterior placenta, UA dopplers wnl; Twin B: BPP 10/10, MVP 4.6cm, posterior placenta, UA dopplers elevated (s/d ratio: 3.69) \par - Recommend BPP/NST twice weekly \par -f/u growth q month.  \par -->From previous note: Fe bid with meals for incr nutritional req'ts with multiple gestation. Ducosate prn.  \par \par #Pregnancy  \par -Prenatal care is with Dr. Hairston  \par -Continue PNVs and Folic acid  \par - PTL precautions discussed  \par - NIPT low risk per patient \par - AFP not done

## 2022-09-08 NOTE — END OF VISIT
[FreeTextEntry3] : [facility] Staff\par \par I saw and evaluated Ms. GARCIA with Dr. St.\par \par GDM\par - Overall well controlled on diet\par - Walking after meals encouraged\par - Will not start medication at this time.\par \par -Dichorionic diamniotic twin gestation with fetal growth restriction of Twin A.\par - Twice weekly  testing with Umbilical Artery Dopplers.\par - BPP today 10/10 for both fetuses with Umbilical Artery Doppler flow within normal limits for both fetuses.\par \par Given the gestational diabetes, twin gestation, and growth restriction of one twin II think delivery between 36 weeks 0 days and 37 weeks 0 days is reasonable.  We discussed  steroids in the late  period.  The later in gestation the babies are delivered, the less the respiratory benefit.  It is not clear whether  steroids in the late  period affect neurological outcome of the babies. The final timing of delivery will be determined by Dr. Kate.\par \par Prenatal care is with Dr. Kate\par Fetal movement and labor precautions discussed.\par Twice weekly  testing.\par Follow up in two weeks with the fingerstick log.\par \par Sridhar German MD\par \par Discussed with Dr. Kate by phone.\par \par

## 2022-09-09 DIAGNOSIS — O36.5930 MATERNAL CARE FOR OTHER KNOWN OR SUSPECTED POOR FETAL GROWTH, THIRD TRIMESTER, NOT APPLICABLE OR UNSPECIFIED: ICD-10-CM

## 2022-09-09 DIAGNOSIS — Z3A.34 34 WEEKS GESTATION OF PREGNANCY: ICD-10-CM

## 2022-09-09 DIAGNOSIS — O24.410 GESTATIONAL DIABETES MELLITUS IN PREGNANCY, DIET CONTROLLED: ICD-10-CM

## 2022-09-09 DIAGNOSIS — O30.043 TWIN PREGNANCY, DICHORIONIC/DIAMNIOTIC, THIRD TRIMESTER: ICD-10-CM

## 2022-09-12 ENCOUNTER — APPOINTMENT (OUTPATIENT)
Dept: ANTEPARTUM | Facility: CLINIC | Age: 27
End: 2022-09-12
Payer: COMMERCIAL

## 2022-09-12 ENCOUNTER — ASOB RESULT (OUTPATIENT)
Age: 27
End: 2022-09-12

## 2022-09-12 ENCOUNTER — APPOINTMENT (OUTPATIENT)
Age: 27
End: 2022-09-12

## 2022-09-12 ENCOUNTER — OUTPATIENT (OUTPATIENT)
Dept: OUTPATIENT SERVICES | Facility: HOSPITAL | Age: 27
LOS: 1 days | Discharge: HOME | End: 2022-09-12

## 2022-09-12 PROCEDURE — ZZZZZ: CPT

## 2022-09-12 PROCEDURE — 76818 FETAL BIOPHYS PROFILE W/NST: CPT | Mod: 26,59,76

## 2022-09-12 PROCEDURE — 76820 UMBILICAL ARTERY ECHO: CPT | Mod: 26,59

## 2022-09-12 PROCEDURE — 76820 UMBILICAL ARTERY ECHO: CPT | Mod: 26

## 2022-09-12 PROCEDURE — 76818 FETAL BIOPHYS PROFILE W/NST: CPT | Mod: 26,59

## 2022-09-15 ENCOUNTER — APPOINTMENT (OUTPATIENT)
Dept: ANTEPARTUM | Facility: CLINIC | Age: 27
End: 2022-09-15
Payer: COMMERCIAL

## 2022-09-15 ENCOUNTER — OUTPATIENT (OUTPATIENT)
Dept: OUTPATIENT SERVICES | Facility: HOSPITAL | Age: 27
LOS: 1 days | Discharge: HOME | End: 2022-09-15

## 2022-09-15 ENCOUNTER — ASOB RESULT (OUTPATIENT)
Age: 27
End: 2022-09-15

## 2022-09-15 DIAGNOSIS — O24.410 GESTATIONAL DIABETES MELLITUS IN PREGNANCY, DIET CONTROLLED: ICD-10-CM

## 2022-09-15 DIAGNOSIS — O30.043 TWIN PREGNANCY, DICHORIONIC/DIAMNIOTIC, THIRD TRIMESTER: ICD-10-CM

## 2022-09-15 DIAGNOSIS — O36.5930 MATERNAL CARE FOR OTHER KNOWN OR SUSPECTED POOR FETAL GROWTH, THIRD TRIMESTER, NOT APPLICABLE OR UNSPECIFIED: ICD-10-CM

## 2022-09-15 DIAGNOSIS — Z3A.35 35 WEEKS GESTATION OF PREGNANCY: ICD-10-CM

## 2022-09-15 PROCEDURE — 76818 FETAL BIOPHYS PROFILE W/NST: CPT | Mod: 26,59

## 2022-09-15 PROCEDURE — ZZZZZ: CPT

## 2022-09-15 PROCEDURE — 76816 OB US FOLLOW-UP PER FETUS: CPT | Mod: 26

## 2022-09-15 PROCEDURE — 76820 UMBILICAL ARTERY ECHO: CPT | Mod: 26,59,76

## 2022-09-16 DIAGNOSIS — O24.410 GESTATIONAL DIABETES MELLITUS IN PREGNANCY, DIET CONTROLLED: ICD-10-CM

## 2022-09-16 DIAGNOSIS — Z3A.35 35 WEEKS GESTATION OF PREGNANCY: ICD-10-CM

## 2022-09-16 DIAGNOSIS — O30.043 TWIN PREGNANCY, DICHORIONIC/DIAMNIOTIC, THIRD TRIMESTER: ICD-10-CM

## 2022-09-16 DIAGNOSIS — O36.5930 MATERNAL CARE FOR OTHER KNOWN OR SUSPECTED POOR FETAL GROWTH, THIRD TRIMESTER, NOT APPLICABLE OR UNSPECIFIED: ICD-10-CM

## 2022-09-19 ENCOUNTER — APPOINTMENT (OUTPATIENT)
Dept: ANTEPARTUM | Facility: CLINIC | Age: 27
End: 2022-09-19
Payer: COMMERCIAL

## 2022-09-19 ENCOUNTER — ASOB RESULT (OUTPATIENT)
Age: 27
End: 2022-09-19

## 2022-09-19 ENCOUNTER — APPOINTMENT (OUTPATIENT)
Dept: ANTEPARTUM | Facility: CLINIC | Age: 27
End: 2022-09-19

## 2022-09-19 ENCOUNTER — OUTPATIENT (OUTPATIENT)
Dept: OUTPATIENT SERVICES | Facility: HOSPITAL | Age: 27
LOS: 1 days | Discharge: HOME | End: 2022-09-19

## 2022-09-19 PROCEDURE — ZZZZZ: CPT

## 2022-09-19 PROCEDURE — 76818 FETAL BIOPHYS PROFILE W/NST: CPT | Mod: 26

## 2022-09-19 PROCEDURE — 76818 FETAL BIOPHYS PROFILE W/NST: CPT | Mod: 26,59

## 2022-09-19 PROCEDURE — 76820 UMBILICAL ARTERY ECHO: CPT | Mod: 26,59

## 2022-09-19 PROCEDURE — 76820 UMBILICAL ARTERY ECHO: CPT | Mod: 26

## 2022-09-21 ENCOUNTER — NON-APPOINTMENT (OUTPATIENT)
Age: 27
End: 2022-09-21

## 2022-09-22 ENCOUNTER — ASOB RESULT (OUTPATIENT)
Age: 27
End: 2022-09-22

## 2022-09-22 ENCOUNTER — APPOINTMENT (OUTPATIENT)
Dept: ANTEPARTUM | Facility: CLINIC | Age: 27
End: 2022-09-22
Payer: COMMERCIAL

## 2022-09-22 ENCOUNTER — RESULT CHARGE (OUTPATIENT)
Age: 27
End: 2022-09-22

## 2022-09-22 ENCOUNTER — OUTPATIENT (OUTPATIENT)
Dept: OUTPATIENT SERVICES | Facility: HOSPITAL | Age: 27
LOS: 1 days | Discharge: HOME | End: 2022-09-22

## 2022-09-22 VITALS — DIASTOLIC BLOOD PRESSURE: 65 MMHG | SYSTOLIC BLOOD PRESSURE: 115 MMHG

## 2022-09-22 VITALS
DIASTOLIC BLOOD PRESSURE: 65 MMHG | OXYGEN SATURATION: 98 % | HEART RATE: 80 BPM | BODY MASS INDEX: 28.17 KG/M2 | TEMPERATURE: 98.2 F | HEIGHT: 64 IN | WEIGHT: 165 LBS | SYSTOLIC BLOOD PRESSURE: 115 MMHG

## 2022-09-22 DIAGNOSIS — Z3A.36 36 WEEKS GESTATION OF PREGNANCY: ICD-10-CM

## 2022-09-22 DIAGNOSIS — O30.043 TWIN PREGNANCY, DICHORIONIC/DIAMNIOTIC, THIRD TRIMESTER: ICD-10-CM

## 2022-09-22 DIAGNOSIS — O24.410 GESTATIONAL DIABETES MELLITUS IN PREGNANCY, DIET CONTROLLED: ICD-10-CM

## 2022-09-22 DIAGNOSIS — O36.5930 MATERNAL CARE FOR OTHER KNOWN OR SUSPECTED POOR FETAL GROWTH, THIRD TRIMESTER, NOT APPLICABLE OR UNSPECIFIED: ICD-10-CM

## 2022-09-22 LAB
BILIRUB UR QL STRIP: NORMAL
BP DIAS: 65 MM HG
BP SYS: 115 MM HG
CLARITY UR: CLEAR
COLLECTION METHOD: NORMAL
FETAL MOVEMENT: PRESENT
GLUCOSE BLDC GLUCOMTR-MCNC: 120
GLUCOSE BLDC GLUCOMTR-MCNC: 120 MG/DL — HIGH (ref 70–99)
GLUCOSE UR-MCNC: NORMAL
HCG UR QL: 0.2 EU/DL
HGB UR QL STRIP.AUTO: NORMAL
KETONES UR-MCNC: NORMAL
LEUKOCYTE ESTERASE UR QL STRIP: NORMAL
NITRITE UR QL STRIP: NORMAL
OB COMMENTS: NORMAL
PH UR STRIP: 6
PROT UR STRIP-MCNC: NORMAL
SCHEDULED VISIT: YES
SP GR UR STRIP: 1.01
URINE ALBUMIN/PROTEIN: NORMAL
URINE GLUCOSE: NORMAL
URINE KETONES: NORMAL
WEEKS GESTATION: 36.3

## 2022-09-22 PROCEDURE — 76820 UMBILICAL ARTERY ECHO: CPT | Mod: 26,59,76

## 2022-09-22 PROCEDURE — 76818 FETAL BIOPHYS PROFILE W/NST: CPT | Mod: 26,59

## 2022-09-22 PROCEDURE — 76820 UMBILICAL ARTERY ECHO: CPT | Mod: 26,59

## 2022-09-22 PROCEDURE — 99213 OFFICE O/P EST LOW 20 MIN: CPT | Mod: 25

## 2022-09-22 PROCEDURE — 76818 FETAL BIOPHYS PROFILE W/NST: CPT | Mod: 26

## 2022-09-22 NOTE — END OF VISIT
[FreeTextEntry3] : [facility] Staff\par \par I saw and evaluated Ms. GARCIA with Dr. St.\par \par GDM\par - Overall well controlled on diet.  She eats two meals a day, she was advised to eat smaller more frequent meals.\par - Walking after meals encouraged\par - Will not start medication at this time.\par \par -Dichorionic diamniotic twin gestation with fetal growth restriction of Twin A.\par - Twice weekly  testing with Umbilical Artery Dopplers.\par - BPP today 10/10 for both fetuses with Umbilical Artery Doppler flow within normal limits for both fetuses.\par \par Given the gestational diabetes, twin gestation, and growth restriction of one twin II think delivery between 36 weeks 0 days and 37 weeks 0 days.  As far as I understand delivery will be scheduled for early next week.\par \par Prenatal care is with Dr. Kate\par 2 hour GTT around 6 - 8 weeks postpartum to be ordered by Dr. Kate.\par Follow up in our office as needed.\par \par Sridhar German MD\par \par \par \par

## 2022-09-22 NOTE — DISCUSSION/SUMMARY
[FreeTextEntry1] : 28yo  at 36w3d, LAURITA 10/17/2022, by LMP 1/10/22 c/w early ultrasound, di-di twin gestation, naturally conceived, here for f/u MFM visit for di-di twins with FGR and GDM. Comanaged by Dr. Hairston.  \par \par Patient denies contractions, vaginal bleeding, leakage of fluid. Reports good fetal movement x2.  \par \par FS log reviewed: \par *Fastin-90 (0 out of 14 values elevated) \par *2hr PP:  (9 out of 31 values elevated) \par \par LAURITA: 10/17/22 \par \par Complications \par GDMA1 \par Di-Di twin gestation \par \par All prenatal labs reviewed:  \par ; GTT 79/182/182/162  \par Opos  \par Measles non-immune  \par Mumps/Rubella Immune  \par Varicella immune  \par Lead neg  \par HCV neg  \par HBsAg NR  \par HIV neg  \par RPR NR  \par NIPT low risk (per patient, results not in Allscripts)  \par \par OBHx: primigravid \par \par Meds: PNVs \par \par SocHx: denies \par \par Gen: AOx3, NAD\par Cardiac: RRR, S1S2, no m/r/g\par Pulm: CTA b/l\par Abd: soft, gravid, nontender, no palpable ctx\par Ext: no edema, nontender, no erythema or warmth\par  \par A/P: 28yo  at 36w3d, LAURITA 10/17/2022, by LMP 1/10/22 c/w early ultrasound, di-di twin gestation, naturally conceived, here for f/u MFM visit for di-di twins with FGR and GDM. Comanaged by . \par \par  #GDM \par \par -FS today: 120 (1.5h PP) \par *Fastin-90 (0 out of 14 values elevated) \par *2hr PP:  (9 out of 31 values elevated) \par \par - FS values well controlled with dietary changes and ambulation after meals.  \par - S/p diabetic teaching and nutrition counseling  \par - Previously discussed risks of GDM including but not limited to: macrosomia, shoulder dystocia, increased risk of , stillbirth, NICU admission for hypoglycemia and jaundice, and preeclampsia. Explained that if sugars are well controlled the above complications decrease.  \par - FS goal fasting <95, 2 hour PP<120. Encouraged to document FS.  \par - Previously discussed increased risk of type II DM later in life and that we will screen for that after pregnancy.  \par \par #Di-di twin gestation with FGR:  \par -NT normal x 2  \par - 33w3d: Twin A: BPP 8/8, 1691g (2%), MVP 3.62cm, posterior placenta, UA dopplers wnl; Twin B: BPP 8/8, MVP 4.5cm, 2115g (31%) posterior placenta, UA dopplers wnl; FW discordancy 20% \par -34w1d: Twin A: BPP 10/10, MVP 3.46cm, posterior placenta, UA dopplers wnl; Twin B: BPP 10/10, MVP 4.6cm, posterior placenta, UA dopplers elevated (s/d ratio: 3.69) \par -36w0d: BPP 10/10 x2, UA dopplers wnl x2, will f/u scan done today\par - Recommend BPP/NST twice weekly \par -f/u growth q month.  \par -delivery recommended at 36w -37w \par \par -->From previous note: Fe bid with meals for incr nutritional req'ts with multiple gestation. Ducosate prn.  \par \par #Pregnancy  \par -Prenatal care is with Dr. Hairston  \par -Continue PNVs and Folic acid  \par - PTL precautions discussed  \par - NIPT low risk per patient \par - AFP not done \par -delivery planned for  or  (primary LTCS) \par \par

## 2022-09-23 DIAGNOSIS — O30.043 TWIN PREGNANCY, DICHORIONIC/DIAMNIOTIC, THIRD TRIMESTER: ICD-10-CM

## 2022-09-23 DIAGNOSIS — Z3A.36 36 WEEKS GESTATION OF PREGNANCY: ICD-10-CM

## 2022-09-23 DIAGNOSIS — O36.5930 MATERNAL CARE FOR OTHER KNOWN OR SUSPECTED POOR FETAL GROWTH, THIRD TRIMESTER, NOT APPLICABLE OR UNSPECIFIED: ICD-10-CM

## 2022-09-23 DIAGNOSIS — O24.410 GESTATIONAL DIABETES MELLITUS IN PREGNANCY, DIET CONTROLLED: ICD-10-CM

## 2022-09-27 ENCOUNTER — RESULT REVIEW (OUTPATIENT)
Age: 27
End: 2022-09-27

## 2022-09-27 ENCOUNTER — INPATIENT (INPATIENT)
Facility: HOSPITAL | Age: 27
LOS: 1 days | Discharge: HOME | End: 2022-09-29
Attending: OBSTETRICS & GYNECOLOGY | Admitting: OBSTETRICS & GYNECOLOGY

## 2022-09-27 ENCOUNTER — TRANSCRIPTION ENCOUNTER (OUTPATIENT)
Age: 27
End: 2022-09-27

## 2022-09-27 DIAGNOSIS — Z90.89 ACQUIRED ABSENCE OF OTHER ORGANS: Chronic | ICD-10-CM

## 2022-09-27 DIAGNOSIS — O30.009 TWIN PREGNANCY, UNSPECIFIED NUMBER OF PLACENTA AND UNSPECIFIED NUMBER OF AMNIOTIC SACS, UNSPECIFIED TRIMESTER: ICD-10-CM

## 2022-09-27 LAB
AMPHET UR-MCNC: NEGATIVE — SIGNIFICANT CHANGE UP
ANION GAP SERPL CALC-SCNC: 8 MMOL/L — SIGNIFICANT CHANGE UP (ref 7–14)
APPEARANCE UR: CLEAR — SIGNIFICANT CHANGE UP
BARBITURATES UR SCN-MCNC: NEGATIVE — SIGNIFICANT CHANGE UP
BASOPHILS # BLD AUTO: 0.01 K/UL — SIGNIFICANT CHANGE UP (ref 0–0.2)
BASOPHILS # BLD AUTO: 0.02 K/UL — SIGNIFICANT CHANGE UP (ref 0–0.2)
BASOPHILS # BLD AUTO: 0.02 K/UL — SIGNIFICANT CHANGE UP (ref 0–0.2)
BASOPHILS NFR BLD AUTO: 0.1 % — SIGNIFICANT CHANGE UP (ref 0–1)
BASOPHILS NFR BLD AUTO: 0.1 % — SIGNIFICANT CHANGE UP (ref 0–1)
BASOPHILS NFR BLD AUTO: 0.2 % — SIGNIFICANT CHANGE UP (ref 0–1)
BENZODIAZ UR-MCNC: NEGATIVE — SIGNIFICANT CHANGE UP
BILIRUB UR-MCNC: NEGATIVE — SIGNIFICANT CHANGE UP
BLD GP AB SCN SERPL QL: SIGNIFICANT CHANGE UP
BUN SERPL-MCNC: 5 MG/DL — LOW (ref 10–20)
BUPRENORPHINE SCREEN, URINE RESULT: NEGATIVE — SIGNIFICANT CHANGE UP
CALCIUM SERPL-MCNC: 8.7 MG/DL — SIGNIFICANT CHANGE UP (ref 8.4–10.5)
CHLORIDE SERPL-SCNC: 105 MMOL/L — SIGNIFICANT CHANGE UP (ref 98–110)
CO2 SERPL-SCNC: 25 MMOL/L — SIGNIFICANT CHANGE UP (ref 17–32)
COCAINE METAB.OTHER UR-MCNC: NEGATIVE — SIGNIFICANT CHANGE UP
COLOR SPEC: SIGNIFICANT CHANGE UP
CREAT SERPL-MCNC: <0.5 MG/DL — LOW (ref 0.7–1.5)
DIFF PNL FLD: NEGATIVE — SIGNIFICANT CHANGE UP
EGFR: 139 ML/MIN/1.73M2 — SIGNIFICANT CHANGE UP
EOSINOPHIL # BLD AUTO: 0.01 K/UL — SIGNIFICANT CHANGE UP (ref 0–0.7)
EOSINOPHIL # BLD AUTO: 0.03 K/UL — SIGNIFICANT CHANGE UP (ref 0–0.7)
EOSINOPHIL # BLD AUTO: 0.05 K/UL — SIGNIFICANT CHANGE UP (ref 0–0.7)
EOSINOPHIL NFR BLD AUTO: 0.1 % — SIGNIFICANT CHANGE UP (ref 0–8)
EOSINOPHIL NFR BLD AUTO: 0.3 % — SIGNIFICANT CHANGE UP (ref 0–8)
EOSINOPHIL NFR BLD AUTO: 0.6 % — SIGNIFICANT CHANGE UP (ref 0–8)
FENTANYL UR QL: NEGATIVE — SIGNIFICANT CHANGE UP
GLUCOSE BLDC GLUCOMTR-MCNC: 85 MG/DL — SIGNIFICANT CHANGE UP (ref 70–99)
GLUCOSE SERPL-MCNC: 90 MG/DL — SIGNIFICANT CHANGE UP (ref 70–99)
GLUCOSE UR QL: SIGNIFICANT CHANGE UP
HCT VFR BLD CALC: 25.2 % — LOW (ref 37–47)
HCT VFR BLD CALC: 31 % — LOW (ref 37–47)
HCT VFR BLD CALC: 34.9 % — LOW (ref 37–47)
HGB BLD-MCNC: 10.6 G/DL — LOW (ref 12–16)
HGB BLD-MCNC: 12.2 G/DL — SIGNIFICANT CHANGE UP (ref 12–16)
HGB BLD-MCNC: 8.7 G/DL — LOW (ref 12–16)
IMM GRANULOCYTES NFR BLD AUTO: 1.2 % — HIGH (ref 0.1–0.3)
IMM GRANULOCYTES NFR BLD AUTO: 1.9 % — HIGH (ref 0.1–0.3)
IMM GRANULOCYTES NFR BLD AUTO: 2.9 % — HIGH (ref 0.1–0.3)
KETONES UR-MCNC: NEGATIVE — SIGNIFICANT CHANGE UP
L&D DRUG SCREEN, URINE: SIGNIFICANT CHANGE UP
LEUKOCYTE ESTERASE UR-ACNC: NEGATIVE — SIGNIFICANT CHANGE UP
LYMPHOCYTES # BLD AUTO: 1.29 K/UL — SIGNIFICANT CHANGE UP (ref 1.2–3.4)
LYMPHOCYTES # BLD AUTO: 1.79 K/UL — SIGNIFICANT CHANGE UP (ref 1.2–3.4)
LYMPHOCYTES # BLD AUTO: 1.97 K/UL — SIGNIFICANT CHANGE UP (ref 1.2–3.4)
LYMPHOCYTES # BLD AUTO: 12.4 % — LOW (ref 20.5–51.1)
LYMPHOCYTES # BLD AUTO: 13.1 % — LOW (ref 20.5–51.1)
LYMPHOCYTES # BLD AUTO: 22.7 % — SIGNIFICANT CHANGE UP (ref 20.5–51.1)
MCHC RBC-ENTMCNC: 29.3 PG — SIGNIFICANT CHANGE UP (ref 27–31)
MCHC RBC-ENTMCNC: 29.3 PG — SIGNIFICANT CHANGE UP (ref 27–31)
MCHC RBC-ENTMCNC: 29.5 PG — SIGNIFICANT CHANGE UP (ref 27–31)
MCHC RBC-ENTMCNC: 34.2 G/DL — SIGNIFICANT CHANGE UP (ref 32–37)
MCHC RBC-ENTMCNC: 34.5 G/DL — SIGNIFICANT CHANGE UP (ref 32–37)
MCHC RBC-ENTMCNC: 35 G/DL — SIGNIFICANT CHANGE UP (ref 32–37)
MCV RBC AUTO: 83.9 FL — SIGNIFICANT CHANGE UP (ref 81–99)
MCV RBC AUTO: 85.4 FL — SIGNIFICANT CHANGE UP (ref 81–99)
MCV RBC AUTO: 85.6 FL — SIGNIFICANT CHANGE UP (ref 81–99)
METHADONE UR-MCNC: NEGATIVE — SIGNIFICANT CHANGE UP
MONOCYTES # BLD AUTO: 0.35 K/UL — SIGNIFICANT CHANGE UP (ref 0.1–0.6)
MONOCYTES # BLD AUTO: 0.77 K/UL — HIGH (ref 0.1–0.6)
MONOCYTES # BLD AUTO: 1 K/UL — HIGH (ref 0.1–0.6)
MONOCYTES NFR BLD AUTO: 3.5 % — SIGNIFICANT CHANGE UP (ref 1.7–9.3)
MONOCYTES NFR BLD AUTO: 6.9 % — SIGNIFICANT CHANGE UP (ref 1.7–9.3)
MONOCYTES NFR BLD AUTO: 8.9 % — SIGNIFICANT CHANGE UP (ref 1.7–9.3)
NEUTROPHILS # BLD AUTO: 11.46 K/UL — HIGH (ref 1.4–6.5)
NEUTROPHILS # BLD AUTO: 5.61 K/UL — SIGNIFICANT CHANGE UP (ref 1.4–6.5)
NEUTROPHILS # BLD AUTO: 7.99 K/UL — HIGH (ref 1.4–6.5)
NEUTROPHILS NFR BLD AUTO: 64.8 % — SIGNIFICANT CHANGE UP (ref 42.2–75.2)
NEUTROPHILS NFR BLD AUTO: 79.3 % — HIGH (ref 42.2–75.2)
NEUTROPHILS NFR BLD AUTO: 81 % — HIGH (ref 42.2–75.2)
NITRITE UR-MCNC: NEGATIVE — SIGNIFICANT CHANGE UP
NRBC # BLD: 0 /100 WBCS — SIGNIFICANT CHANGE UP (ref 0–0)
OPIATES UR-MCNC: NEGATIVE — SIGNIFICANT CHANGE UP
OXYCODONE UR-MCNC: NEGATIVE — SIGNIFICANT CHANGE UP
PCP UR-MCNC: NEGATIVE — SIGNIFICANT CHANGE UP
PH UR: 7 — SIGNIFICANT CHANGE UP (ref 5–8)
PLATELET # BLD AUTO: 159 K/UL — SIGNIFICANT CHANGE UP (ref 130–400)
PLATELET # BLD AUTO: 159 K/UL — SIGNIFICANT CHANGE UP (ref 130–400)
PLATELET # BLD AUTO: 176 K/UL — SIGNIFICANT CHANGE UP (ref 130–400)
POTASSIUM SERPL-MCNC: 3.9 MMOL/L — SIGNIFICANT CHANGE UP (ref 3.5–5)
POTASSIUM SERPL-SCNC: 3.9 MMOL/L — SIGNIFICANT CHANGE UP (ref 3.5–5)
PRENATAL SYPHILIS TEST: ABNORMAL
PROPOXYPHENE QUALITATIVE URINE RESULT: NEGATIVE — SIGNIFICANT CHANGE UP
PROT UR-MCNC: NEGATIVE — SIGNIFICANT CHANGE UP
RBC # BLD: 2.95 M/UL — LOW (ref 4.2–5.4)
RBC # BLD: 3.62 M/UL — LOW (ref 4.2–5.4)
RBC # BLD: 4.16 M/UL — LOW (ref 4.2–5.4)
RBC # FLD: 13.7 % — SIGNIFICANT CHANGE UP (ref 11.5–14.5)
RBC # FLD: 13.8 % — SIGNIFICANT CHANGE UP (ref 11.5–14.5)
RBC # FLD: 13.9 % — SIGNIFICANT CHANGE UP (ref 11.5–14.5)
SODIUM SERPL-SCNC: 138 MMOL/L — SIGNIFICANT CHANGE UP (ref 135–146)
SP GR SPEC: 1.01 — SIGNIFICANT CHANGE UP (ref 1.01–1.03)
UROBILINOGEN FLD QL: SIGNIFICANT CHANGE UP
WBC # BLD: 14.45 K/UL — HIGH (ref 4.8–10.8)
WBC # BLD: 8.66 K/UL — SIGNIFICANT CHANGE UP (ref 4.8–10.8)
WBC # BLD: 9.87 K/UL — SIGNIFICANT CHANGE UP (ref 4.8–10.8)
WBC # FLD AUTO: 14.45 K/UL — HIGH (ref 4.8–10.8)
WBC # FLD AUTO: 8.66 K/UL — SIGNIFICANT CHANGE UP (ref 4.8–10.8)
WBC # FLD AUTO: 9.87 K/UL — SIGNIFICANT CHANGE UP (ref 4.8–10.8)

## 2022-09-27 PROCEDURE — 88307 TISSUE EXAM BY PATHOLOGIST: CPT | Mod: 26

## 2022-09-27 RX ORDER — ENOXAPARIN SODIUM 100 MG/ML
40 INJECTION SUBCUTANEOUS EVERY 24 HOURS
Refills: 0 | Status: DISCONTINUED | OUTPATIENT
Start: 2022-09-27 | End: 2022-09-29

## 2022-09-27 RX ORDER — OXYCODONE HYDROCHLORIDE 5 MG/1
5 TABLET ORAL ONCE
Refills: 0 | Status: DISCONTINUED | OUTPATIENT
Start: 2022-09-27 | End: 2022-09-29

## 2022-09-27 RX ORDER — SODIUM CHLORIDE 9 MG/ML
1000 INJECTION, SOLUTION INTRAVENOUS
Refills: 0 | Status: DISCONTINUED | OUTPATIENT
Start: 2022-09-27 | End: 2022-09-27

## 2022-09-27 RX ORDER — OXYTOCIN 10 UNIT/ML
333.33 VIAL (ML) INJECTION
Qty: 20 | Refills: 0 | Status: DISCONTINUED | OUTPATIENT
Start: 2022-09-27 | End: 2022-09-27

## 2022-09-27 RX ORDER — TETANUS TOXOID, REDUCED DIPHTHERIA TOXOID AND ACELLULAR PERTUSSIS VACCINE, ADSORBED 5; 2.5; 8; 8; 2.5 [IU]/.5ML; [IU]/.5ML; UG/.5ML; UG/.5ML; UG/.5ML
0.5 SUSPENSION INTRAMUSCULAR ONCE
Refills: 0 | Status: DISCONTINUED | OUTPATIENT
Start: 2022-09-27 | End: 2022-09-29

## 2022-09-27 RX ORDER — OXYCODONE HYDROCHLORIDE 5 MG/1
5 TABLET ORAL
Refills: 0 | Status: DISCONTINUED | OUTPATIENT
Start: 2022-09-27 | End: 2022-09-29

## 2022-09-27 RX ORDER — MORPHINE SULFATE 50 MG/1
0.2 CAPSULE, EXTENDED RELEASE ORAL ONCE
Refills: 0 | Status: DISCONTINUED | OUTPATIENT
Start: 2022-09-27 | End: 2022-09-27

## 2022-09-27 RX ORDER — DEXAMETHASONE 0.5 MG/5ML
4 ELIXIR ORAL EVERY 6 HOURS
Refills: 0 | Status: DISCONTINUED | OUTPATIENT
Start: 2022-09-27 | End: 2022-09-27

## 2022-09-27 RX ORDER — SIMETHICONE 80 MG/1
80 TABLET, CHEWABLE ORAL EVERY 4 HOURS
Refills: 0 | Status: DISCONTINUED | OUTPATIENT
Start: 2022-09-27 | End: 2022-09-29

## 2022-09-27 RX ORDER — CEFAZOLIN SODIUM 1 G
2000 VIAL (EA) INJECTION ONCE
Refills: 0 | Status: COMPLETED | OUTPATIENT
Start: 2022-09-27 | End: 2022-09-27

## 2022-09-27 RX ORDER — ONDANSETRON 8 MG/1
4 TABLET, FILM COATED ORAL EVERY 6 HOURS
Refills: 0 | Status: DISCONTINUED | OUTPATIENT
Start: 2022-09-27 | End: 2022-09-27

## 2022-09-27 RX ORDER — IBUPROFEN 200 MG
600 TABLET ORAL EVERY 6 HOURS
Refills: 0 | Status: DISCONTINUED | OUTPATIENT
Start: 2022-09-27 | End: 2022-09-29

## 2022-09-27 RX ORDER — OXYTOCIN 10 UNIT/ML
333.33 VIAL (ML) INJECTION
Qty: 20 | Refills: 0 | Status: DISCONTINUED | OUTPATIENT
Start: 2022-09-27 | End: 2022-09-29

## 2022-09-27 RX ORDER — DIPHENHYDRAMINE HCL 50 MG
25 CAPSULE ORAL EVERY 6 HOURS
Refills: 0 | Status: DISCONTINUED | OUTPATIENT
Start: 2022-09-27 | End: 2022-09-29

## 2022-09-27 RX ORDER — INFLUENZA VIRUS VACCINE 15; 15; 15; 15 UG/.5ML; UG/.5ML; UG/.5ML; UG/.5ML
0.5 SUSPENSION INTRAMUSCULAR ONCE
Refills: 0 | Status: DISCONTINUED | OUTPATIENT
Start: 2022-09-27 | End: 2022-09-27

## 2022-09-27 RX ORDER — HYDROMORPHONE HYDROCHLORIDE 2 MG/ML
0.5 INJECTION INTRAMUSCULAR; INTRAVENOUS; SUBCUTANEOUS
Refills: 0 | Status: DISCONTINUED | OUTPATIENT
Start: 2022-09-27 | End: 2022-09-27

## 2022-09-27 RX ORDER — METOCLOPRAMIDE HCL 10 MG
10 TABLET ORAL ONCE
Refills: 0 | Status: DISCONTINUED | OUTPATIENT
Start: 2022-09-27 | End: 2022-09-27

## 2022-09-27 RX ORDER — FAMOTIDINE 10 MG/ML
20 INJECTION INTRAVENOUS ONCE
Refills: 0 | Status: COMPLETED | OUTPATIENT
Start: 2022-09-27 | End: 2022-09-27

## 2022-09-27 RX ORDER — MAGNESIUM HYDROXIDE 400 MG/1
30 TABLET, CHEWABLE ORAL
Refills: 0 | Status: DISCONTINUED | OUTPATIENT
Start: 2022-09-27 | End: 2022-09-29

## 2022-09-27 RX ORDER — NALOXONE HYDROCHLORIDE 4 MG/.1ML
0.1 SPRAY NASAL
Refills: 0 | Status: DISCONTINUED | OUTPATIENT
Start: 2022-09-27 | End: 2022-09-27

## 2022-09-27 RX ORDER — SODIUM CHLORIDE 9 MG/ML
1000 INJECTION, SOLUTION INTRAVENOUS ONCE
Refills: 0 | Status: DISCONTINUED | OUTPATIENT
Start: 2022-09-27 | End: 2022-09-27

## 2022-09-27 RX ORDER — IBUPROFEN 200 MG
600 TABLET ORAL EVERY 6 HOURS
Refills: 0 | Status: COMPLETED | OUTPATIENT
Start: 2022-09-27 | End: 2023-08-26

## 2022-09-27 RX ORDER — CITRIC ACID/SODIUM CITRATE 300-500 MG
30 SOLUTION, ORAL ORAL ONCE
Refills: 0 | Status: COMPLETED | OUTPATIENT
Start: 2022-09-27 | End: 2022-09-27

## 2022-09-27 RX ORDER — ACETAMINOPHEN 500 MG
975 TABLET ORAL
Refills: 0 | Status: DISCONTINUED | OUTPATIENT
Start: 2022-09-27 | End: 2022-09-29

## 2022-09-27 RX ORDER — SODIUM CHLORIDE 9 MG/ML
1000 INJECTION, SOLUTION INTRAVENOUS
Refills: 0 | Status: DISCONTINUED | OUTPATIENT
Start: 2022-09-27 | End: 2022-09-29

## 2022-09-27 RX ORDER — ACETAMINOPHEN 500 MG
1000 TABLET ORAL ONCE
Refills: 0 | Status: DISCONTINUED | OUTPATIENT
Start: 2022-09-27 | End: 2022-09-27

## 2022-09-27 RX ORDER — LANOLIN
1 OINTMENT (GRAM) TOPICAL EVERY 6 HOURS
Refills: 0 | Status: DISCONTINUED | OUTPATIENT
Start: 2022-09-27 | End: 2022-09-29

## 2022-09-27 RX ADMIN — Medication 975 MILLIGRAM(S): at 20:37

## 2022-09-27 RX ADMIN — Medication 30 MILLILITER(S): at 08:47

## 2022-09-27 RX ADMIN — Medication 1000 MILLIUNIT(S)/MIN: at 13:02

## 2022-09-27 RX ADMIN — FAMOTIDINE 20 MILLIGRAM(S): 10 INJECTION INTRAVENOUS at 08:47

## 2022-09-27 RX ADMIN — Medication 100 MILLIGRAM(S): at 09:27

## 2022-09-27 RX ADMIN — Medication 975 MILLIGRAM(S): at 21:07

## 2022-09-27 RX ADMIN — Medication 0.2 MILLIGRAM(S): at 10:55

## 2022-09-27 NOTE — OB PROVIDER DELIVERY SUMMARY - NSSELHIDDEN_OBGYN_ALL_OB_FT
[NS_DeliveryRN_OBGYN_ALL_OB_FT:[id number]],[NS_CirculateRN2_OBGYN_ALL_OB_FT:[id number]],[NS_DeliveryAttending1_OBGYN_ALL_OB_FT:[id number]=]

## 2022-09-27 NOTE — PROGRESS NOTE ADULT - SUBJECTIVE AND OBJECTIVE BOX
PGY1 Note:  Section    POD#0. Pt seen and evaluated at bedside. Pain well controlled. Endorses slight lightheadedness when sitting up, relieved by lying down.  Denies dizziness/CP/SOB/palpitations. Denies fever, chills, nausea/vomiting, diarrhea, dysuria, LE pain.     Ambulating: No  Voiding: Mcclellan in place  Flatus: No  Bowel movements: No  Breast or bottle feeding: breast  Diet: Tolerating liquids     Physical Exam  Vital Signs Last 24 Hrs  T(C): 36.2 (27 Sep 2022 15:30), Max: 37.4 (27 Sep 2022 13:41)  T(F): 97.2 (27 Sep 2022 15:30), Max: 99.4 (27 Sep 2022 13:41)  HR: 69 (27 Sep 2022 15:30) (56 - 112)  BP: 103/59 (27 Sep 2022 15:30) (96/54 - 142/80)  RR: 18 (27 Sep 2022 15:30) (16 - 18)  SpO2: 99% (27 Sep 2022 13:19) (93% - 100%)    Gen: AAOx3, NAD  Heart: RRR, S1S2+  Lungs: CTA B/L, no r/r/w   Fundus: firm, below umbilicus   Wound: abdominal dressing in place, no discharge or drainage  Abd: Soft, appropriately tender, nondistended, BS+  Lochia: minimal   Ext: No calf tenderness, no swelling    Urine Output: (4191-0800) 375cc    Labs:                        10.6   9.87  )-----------( 159      ( 27 Sep 2022 12:14 )             31.0                         12.2   8.66  )-----------( 176      ( 27 Sep 2022 06:58 )             34.9        MEDICATIONS  (STANDING):  acetaminophen     Tablet .. 975 milliGRAM(s) Oral <User Schedule>  diphtheria/tetanus/pertussis (acellular) Vaccine (ADAcel) 0.5 milliLiter(s) IntraMuscular once  enoxaparin Injectable 40 milliGRAM(s) SubCutaneous every 24 hours  ibuprofen  Tablet. 600 milliGRAM(s) Oral every 6 hours  lactated ringers. 1000 milliLiter(s) (125 mL/Hr) IV Continuous <Continuous>  oxytocin Infusion 333.333 milliUNIT(s)/Min (1000 mL/Hr) IV Continuous <Continuous>    MEDICATIONS  (PRN):  diphenhydrAMINE 25 milliGRAM(s) Oral every 6 hours PRN Pruritus  lanolin Ointment 1 Application(s) Topical every 6 hours PRN Sore Nipples  magnesium hydroxide Suspension 30 milliLiter(s) Oral two times a day PRN Constipation  oxyCODONE    IR 5 milliGRAM(s) Oral every 3 hours PRN Moderate to Severe Pain (4-10)  oxyCODONE    IR 5 milliGRAM(s) Oral once PRN Moderate to Severe Pain (4-10)  simethicone 80 milliGRAM(s) Chew every 4 hours PRN Gas

## 2022-09-27 NOTE — CHART NOTE - NSCHARTNOTEFT_GEN_A_CORE
PACU ANESTHESIA ADMISSION NOTE      Procedure: Primary low transverse  section      Post op diagnosis:  Twin delivery by         ____  Intubated  TV:______       Rate: ______      FiO2: ______    _x___  Patent Airway    _x___  Full return of protective reflexes    _x___  Full recovery from anesthesia / back to baseline status    Vitals:    See anesthesia record      Mental Status:  _x___ Awake   _____ Alert   _____ Drowsy   _____ Sedated    Nausea/Vomiting:  _x___  NO       ______Yes,   See Post - Op Orders         Pain Scale (0-10):  __0___    Treatment: _x___ None    ____ See Post - Op/PCA Orders    Post - Operative Fluids:   __x__ Oral   ____ See Post - Op Orders    Plan: Discharge:   ____Home       __x___Floor     _____Critical Care    _____  Other:_________________    Comments:  No anesthesia issues or complications noted.  Discharge when criteria met.

## 2022-09-27 NOTE — OB PROVIDER H&P - NSICDXFAMILYHX_GEN_ALL_CORE_FT
FAMILY HISTORY:  Father  Still living? Unknown  Family hx of hypertension, Age at diagnosis: Age Unknown

## 2022-09-27 NOTE — OB RN INTRAOPERATIVE NOTE - NSSELHIDDEN_OBGYN_ALL_OB_FT
[NS_DeliveryRN_OBGYN_ALL_OB_FT:[id number]],[NS_CirculateRN2_OBGYN_ALL_OB_FT:[id number]] [NS_DeliveryRN_OBGYN_ALL_OB_FT:[id number]],[NS_CirculateRN2_OBGYN_ALL_OB_FT:[id number]],[NS_DeliveryAttending1_OBGYN_ALL_OB_FT:[id number]=],[NS_DeliveryAssist1_OBGYN_ALL_OB_FT:[id number]]

## 2022-09-27 NOTE — OB RN DELIVERY SUMMARY - NSSELHIDDEN_OBGYN_ALL_OB_FT
[NS_DeliveryRN_OBGYN_ALL_OB_FT:[id number]],[NS_CirculateRN2_OBGYN_ALL_OB_FT:[id number]] [NS_DeliveryRN_OBGYN_ALL_OB_FT:[id number]],[NS_CirculateRN2_OBGYN_ALL_OB_FT:[id number]=],[NS_DeliveryAttending1_OBGYN_ALL_OB_FT:[id number]=]

## 2022-09-27 NOTE — PROGRESS NOTE ADULT - SUBJECTIVE AND OBJECTIVE BOX
Pt evaluated at bedside, post-op s/p 200cc VB. Pt feeling well, no c/o.     T(C): 36.8 (22 @ 08:46), Max: 36.8 (22 @ 06:49)  HR: 77 (22 @ 11:40) (63 - 112)  BP: 100/59 (22 @ 11:34) (96/54 - 142/80)  RR: 16 (22 @ 08:46) (16 - 16)  SpO2: 100% (22 @ 11:43) (93% - 100%)    Abd: soft, NT, Fundus firm  VE: ~250cc VB on chux. Minimal Vb now    Methergine given at 1055    A/P: POD #0 s/p Primary  for Twins, Finding of Bicornuate Uterus  - Cytotec 1000mcg TX Given  - Will continue to observe  - Dr. Joy/ Dr. Jaramillo aware Pt evaluated at bedside, post-op s/p 200cc VB. Pt feeling well, no c/o.     T(C): 36.8 (22 @ 08:46), Max: 36.8 (22 @ 06:49)  HR: 77 (22 @ 11:40) (63 - 112)  BP: 100/59 (22 @ 11:34) (96/54 - 142/80)  RR: 16 (22 @ 08:46) (16 - 16)  SpO2: 100% (22 @ 11:43) (93% - 100%)    Abd: soft, NT, Fundus firm  VE: ~250cc VB on chux. Minimal Vb now    Methergine given at 1055    A/P: POD #0 s/p Primary  for Twins, Finding of Bicornuate Uterus  - Cytotec 1000mcg SC Given  - CBC stat  - Will continue to observe  - Dr. Joy/ Dr. Jaramillo aware

## 2022-09-27 NOTE — BRIEF OPERATIVE NOTE - OPERATION/FINDINGS
Bicornuate uterus noted. Baby A: live male infant delivered in vertex presentation, Baby B: live female infant delivered in vertex presentation.

## 2022-09-27 NOTE — OB PROVIDER H&P - NSHPLABSRESULTS_GEN_ALL_CORE
Sono @ 11wks Di/ Di Twin S=D x 2, NT WNL  Sono @ 20.3wks Twin A: post placenta, variable presentation, EFW: 332g, 12oz, nl anatomy, Twin B variable, funcal placenta, EFW: 382g, Nl anatomy  Sono @ 34.3wks Twin A:   Sono @ 35wks Twin A vtx, post placenta, Twin B Vtx post placenta, BPP 8/8, Nl Dopplers x 2      NIPT low risk, Di zygotic twins, Fraternal twins    , GTT 79/ 182/ 182/ 162 GDM A1 Fasting 63-85, PP   Sono @ 11wks Di/ Di Twin S=D x 2, NT WNL  Sono @ 20.3wks Twin A: post placenta, variable presentation, EFW: 332g, 12oz, nl anatomy, Twin B variable, funcal placenta, EFW: 382g, Nl anatomy  Sono @ 33.3wks Twin A BPP 8/8, UA dopplers WNL, Twin B EFW 2115g, 31%, Discordance 20%  Sono @ 34.3wks BPP 10/10 x 2,Twin B UA dopplers elevated,   Sono @ 35.3wks Twin A: BPP 10/10, EFW: 2107g, 4%, AC <1%, 26% discordance, Twin B: BPP 10/10. EFW: 2859g, 69%, UA doppler WNL x 2  SOno @ 36.3wks BPP 10/10. UA dopplerd WNL x 2    NIPT low risk, Di zygotic twins, Fraternal twins    , GTT 79/ 182/ 182/ 162 GDM A1 Fasting 63-85, PP   Sono @ 11wks Di/ Di Twin S=D x 2, NT WNL  Sono @ 20.3wks Twin A: post placenta, variable presentation, EFW: 332g, 12oz, nl anatomy, Twin B variable, fundal placenta, EFW: 382g, Nl anatomy  Sono @ 33.3wks Twin A BPP 8/8, UA dopplers WNL, Twin B EFW 2115g, 31%, Discordance 20%  Sono @ 34.3wks BPP 10/10 x 2,Twin B UA dopplers elevated,   Sono @ 35.3wks Twin A: BPP 10/10, EFW: 2107g, 4%, AC <1%, 26% discordance, Twin B: BPP 10/10. EFW: 2859g, 69%, UA doppler WNL x 2  SOno @ 36.3wks BPP 10/10. UA dopplerd WNL x 2    NIPT low risk, Di zygotic twins, Fraternal twins    , GTT 79/ 182/ 182/ 162

## 2022-09-27 NOTE — OB PROVIDER H&P - NSHPPHYSICALEXAM_GEN_ALL_CORE
T(C): 36.8 (09-27-22 @ 06:49), Max: 36.8 (09-27-22 @ 06:49)  HR: 95 (09-27-22 @ 07:48) (82 - 106)  BP: 120/68 (09-27-22 @ 07:48) (120/68 - 142/80)  RR: 16 (09-27-22 @ 06:49) (16 - 16)    Abd: gravid, soft, NT  VE: Deferred  Ctx: none noted  FHR: A: 130 moderate variability, B: 140 moderate variability Cat II    Bedside sono: vtx/ Vtx T(C): 36.8 (09-27-22 @ 06:49), Max: 36.8 (09-27-22 @ 06:49)  HR: 95 (09-27-22 @ 07:48) (82 - 106)  BP: 120/68 (09-27-22 @ 07:48) (120/68 - 142/80)  RR: 16 (09-27-22 @ 06:49) (16 - 16)    Glucose 120    Abd: gravid, soft, NT  VE: Deferred  Ctx: none noted  FHR: A: 130 moderate variability, B: 140 moderate variability Cat II    Bedside sono: vtx/ Vtx

## 2022-09-27 NOTE — OB PROVIDER H&P - HISTORY OF PRESENT ILLNESS
Pt is a 27y.o.  @ 37.1wks Di/ Di Twin gestation presents for Primary , Twin A FGR.  Pt is a 27y.o.  @ 37.1wks Di/ Di Twin gestation presents for Primary , Twin A FGR 4%. Pregnancy complicated by GDM A1, Diet Controlled. Pt denies Ctx, LOF or VB and reports good FM x 2

## 2022-09-27 NOTE — OB PROVIDER H&P - ASSESSMENT
27y.o.  @ 37.1wks Di/ Di twin gestation for FGR in Twin A, GDM A1  Admit to L&D  IVF, labs  Continuous EFM and toco  SCD's  Mcclellan catheter  Abdominal prep  Ancef prior to OR  On call to OR  Dr. Sanchez Aware

## 2022-09-28 LAB
BASOPHILS # BLD AUTO: 0.02 K/UL — SIGNIFICANT CHANGE UP (ref 0–0.2)
BASOPHILS # BLD AUTO: 0.02 K/UL — SIGNIFICANT CHANGE UP (ref 0–0.2)
BASOPHILS NFR BLD AUTO: 0.2 % — SIGNIFICANT CHANGE UP (ref 0–1)
BASOPHILS NFR BLD AUTO: 0.2 % — SIGNIFICANT CHANGE UP (ref 0–1)
EOSINOPHIL # BLD AUTO: 0.02 K/UL — SIGNIFICANT CHANGE UP (ref 0–0.7)
EOSINOPHIL # BLD AUTO: 0.04 K/UL — SIGNIFICANT CHANGE UP (ref 0–0.7)
EOSINOPHIL NFR BLD AUTO: 0.2 % — SIGNIFICANT CHANGE UP (ref 0–8)
EOSINOPHIL NFR BLD AUTO: 0.4 % — SIGNIFICANT CHANGE UP (ref 0–8)
HCT VFR BLD CALC: 22.3 % — LOW (ref 37–47)
HCT VFR BLD CALC: 23.3 % — LOW (ref 37–47)
HGB BLD-MCNC: 7.6 G/DL — LOW (ref 12–16)
HGB BLD-MCNC: 8 G/DL — LOW (ref 12–16)
IMM GRANULOCYTES NFR BLD AUTO: 1.4 % — HIGH (ref 0.1–0.3)
IMM GRANULOCYTES NFR BLD AUTO: 1.4 % — HIGH (ref 0.1–0.3)
LYMPHOCYTES # BLD AUTO: 1.72 K/UL — SIGNIFICANT CHANGE UP (ref 1.2–3.4)
LYMPHOCYTES # BLD AUTO: 1.78 K/UL — SIGNIFICANT CHANGE UP (ref 1.2–3.4)
LYMPHOCYTES # BLD AUTO: 16.3 % — LOW (ref 20.5–51.1)
LYMPHOCYTES # BLD AUTO: 16.9 % — LOW (ref 20.5–51.1)
MCHC RBC-ENTMCNC: 29.2 PG — SIGNIFICANT CHANGE UP (ref 27–31)
MCHC RBC-ENTMCNC: 29.7 PG — SIGNIFICANT CHANGE UP (ref 27–31)
MCHC RBC-ENTMCNC: 34.1 G/DL — SIGNIFICANT CHANGE UP (ref 32–37)
MCHC RBC-ENTMCNC: 34.3 G/DL — SIGNIFICANT CHANGE UP (ref 32–37)
MCV RBC AUTO: 85.8 FL — SIGNIFICANT CHANGE UP (ref 81–99)
MCV RBC AUTO: 86.6 FL — SIGNIFICANT CHANGE UP (ref 81–99)
MONOCYTES # BLD AUTO: 0.76 K/UL — HIGH (ref 0.1–0.6)
MONOCYTES # BLD AUTO: 0.81 K/UL — HIGH (ref 0.1–0.6)
MONOCYTES NFR BLD AUTO: 7 % — SIGNIFICANT CHANGE UP (ref 1.7–9.3)
MONOCYTES NFR BLD AUTO: 7.9 % — SIGNIFICANT CHANGE UP (ref 1.7–9.3)
NEUTROPHILS # BLD AUTO: 7.47 K/UL — HIGH (ref 1.4–6.5)
NEUTROPHILS # BLD AUTO: 8.18 K/UL — HIGH (ref 1.4–6.5)
NEUTROPHILS NFR BLD AUTO: 73.2 % — SIGNIFICANT CHANGE UP (ref 42.2–75.2)
NEUTROPHILS NFR BLD AUTO: 74.9 % — SIGNIFICANT CHANGE UP (ref 42.2–75.2)
NRBC # BLD: 0 /100 WBCS — SIGNIFICANT CHANGE UP (ref 0–0)
NRBC # BLD: 0 /100 WBCS — SIGNIFICANT CHANGE UP (ref 0–0)
PLATELET # BLD AUTO: 134 K/UL — SIGNIFICANT CHANGE UP (ref 130–400)
PLATELET # BLD AUTO: 158 K/UL — SIGNIFICANT CHANGE UP (ref 130–400)
RBC # BLD: 2.6 M/UL — LOW (ref 4.2–5.4)
RBC # BLD: 2.69 M/UL — LOW (ref 4.2–5.4)
RBC # FLD: 14 % — SIGNIFICANT CHANGE UP (ref 11.5–14.5)
RBC # FLD: 14 % — SIGNIFICANT CHANGE UP (ref 11.5–14.5)
SURGICAL PATHOLOGY STUDY: SIGNIFICANT CHANGE UP
T PALLIDUM AB TITR SER: NEGATIVE — SIGNIFICANT CHANGE UP
WBC # BLD: 10.2 K/UL — SIGNIFICANT CHANGE UP (ref 4.8–10.8)
WBC # BLD: 10.91 K/UL — HIGH (ref 4.8–10.8)
WBC # FLD AUTO: 10.2 K/UL — SIGNIFICANT CHANGE UP (ref 4.8–10.8)
WBC # FLD AUTO: 10.91 K/UL — HIGH (ref 4.8–10.8)

## 2022-09-28 RX ORDER — IRON SUCROSE 20 MG/ML
200 INJECTION, SOLUTION INTRAVENOUS ONCE
Refills: 0 | Status: COMPLETED | OUTPATIENT
Start: 2022-09-28 | End: 2022-09-28

## 2022-09-28 RX ADMIN — ENOXAPARIN SODIUM 40 MILLIGRAM(S): 100 INJECTION SUBCUTANEOUS at 22:48

## 2022-09-28 RX ADMIN — Medication 600 MILLIGRAM(S): at 06:33

## 2022-09-28 RX ADMIN — SIMETHICONE 80 MILLIGRAM(S): 80 TABLET, CHEWABLE ORAL at 11:56

## 2022-09-28 RX ADMIN — Medication 975 MILLIGRAM(S): at 03:24

## 2022-09-28 RX ADMIN — Medication 600 MILLIGRAM(S): at 11:56

## 2022-09-28 RX ADMIN — SIMETHICONE 80 MILLIGRAM(S): 80 TABLET, CHEWABLE ORAL at 00:28

## 2022-09-28 RX ADMIN — SIMETHICONE 80 MILLIGRAM(S): 80 TABLET, CHEWABLE ORAL at 06:33

## 2022-09-28 RX ADMIN — Medication 975 MILLIGRAM(S): at 20:37

## 2022-09-28 RX ADMIN — Medication 600 MILLIGRAM(S): at 00:58

## 2022-09-28 RX ADMIN — ENOXAPARIN SODIUM 40 MILLIGRAM(S): 100 INJECTION SUBCUTANEOUS at 00:28

## 2022-09-28 RX ADMIN — Medication 600 MILLIGRAM(S): at 19:00

## 2022-09-28 RX ADMIN — Medication 975 MILLIGRAM(S): at 22:46

## 2022-09-28 RX ADMIN — Medication 975 MILLIGRAM(S): at 15:24

## 2022-09-28 RX ADMIN — Medication 600 MILLIGRAM(S): at 00:28

## 2022-09-28 RX ADMIN — SIMETHICONE 80 MILLIGRAM(S): 80 TABLET, CHEWABLE ORAL at 15:24

## 2022-09-28 RX ADMIN — Medication 975 MILLIGRAM(S): at 09:04

## 2022-09-28 RX ADMIN — Medication 600 MILLIGRAM(S): at 17:29

## 2022-09-28 RX ADMIN — Medication 975 MILLIGRAM(S): at 03:54

## 2022-09-28 RX ADMIN — IRON SUCROSE 110 MILLIGRAM(S): 20 INJECTION, SOLUTION INTRAVENOUS at 14:42

## 2022-09-28 NOTE — PROGRESS NOTE ADULT - SUBJECTIVE AND OBJECTIVE BOX
PGY2 note    Chief Complaint: Post  section    HPI: Pt doing well, pain well controlled. No overnight events. She reports dizziness when sitting up. Denies SOB or palpitations. She has been out of bed to the chair. Denies passing gas. She is tolerating regular diet without difficulty.     ROS: Denies cardiovascular or respiratory symptoms    PAST MEDICAL & SURGICAL HISTORY:  No pertinent past medical history    History of tonsillectomy    Physical Exam  Vital Signs Last 24 Hrs  T(F): 97.6 (28 Sep 2022 03:26), Max: 99.4 (27 Sep 2022 13:41)  HR: 77 (28 Sep 2022 03:26) (56 - 112)  BP: 91/50 (28 Sep 2022 03:26) (91/50 - 142/80)  RR: 18 (28 Sep 2022 03:26) (16 - 18)    Physical exam:  General - AAOx3, NAD  Heart - S1S2, RRR  Lungs - CTA BL  Abdomen:  - Soft, appropriately tender, mildly distended, BS+. Clean, dry, intact steri strips in place over pfannenstiel skin incision.  - Fundus firm, appropriately tender, below the umbilicus  Pelvis/Vagina - Normal Lochia  Extremities - No calf tenderness, no swelling    Labs:                        8.7    14.45 )-----------( 159      ( 27 Sep 2022 21:41 )             25.2                         10.6   9.87  )-----------( 159      ( 27 Sep 2022 12:14 )             31.0     Antibody Screen: NEG (22 @ 06:58)    Prenatal Syphilis Test: 1:4 (22 @ 06:58)

## 2022-09-29 ENCOUNTER — APPOINTMENT (OUTPATIENT)
Dept: ANTEPARTUM | Facility: CLINIC | Age: 27
End: 2022-09-29

## 2022-09-29 ENCOUNTER — TRANSCRIPTION ENCOUNTER (OUTPATIENT)
Age: 27
End: 2022-09-29

## 2022-09-29 VITALS
DIASTOLIC BLOOD PRESSURE: 59 MMHG | RESPIRATION RATE: 18 BRPM | SYSTOLIC BLOOD PRESSURE: 108 MMHG | HEART RATE: 104 BPM | TEMPERATURE: 98 F

## 2022-09-29 LAB
BASOPHILS # BLD AUTO: 0.02 K/UL — SIGNIFICANT CHANGE UP (ref 0–0.2)
BASOPHILS NFR BLD AUTO: 0.2 % — SIGNIFICANT CHANGE UP (ref 0–1)
EOSINOPHIL # BLD AUTO: 0.1 K/UL — SIGNIFICANT CHANGE UP (ref 0–0.7)
EOSINOPHIL NFR BLD AUTO: 0.9 % — SIGNIFICANT CHANGE UP (ref 0–8)
HCT VFR BLD CALC: 23.2 % — LOW (ref 37–47)
HGB BLD-MCNC: 7.8 G/DL — LOW (ref 12–16)
IMM GRANULOCYTES NFR BLD AUTO: 1.6 % — HIGH (ref 0.1–0.3)
LYMPHOCYTES # BLD AUTO: 18.7 % — LOW (ref 20.5–51.1)
LYMPHOCYTES # BLD AUTO: 2.05 K/UL — SIGNIFICANT CHANGE UP (ref 1.2–3.4)
MCHC RBC-ENTMCNC: 29.7 PG — SIGNIFICANT CHANGE UP (ref 27–31)
MCHC RBC-ENTMCNC: 33.6 G/DL — SIGNIFICANT CHANGE UP (ref 32–37)
MCV RBC AUTO: 88.2 FL — SIGNIFICANT CHANGE UP (ref 81–99)
MONOCYTES # BLD AUTO: 0.76 K/UL — HIGH (ref 0.1–0.6)
MONOCYTES NFR BLD AUTO: 6.9 % — SIGNIFICANT CHANGE UP (ref 1.7–9.3)
NEUTROPHILS # BLD AUTO: 7.84 K/UL — HIGH (ref 1.4–6.5)
NEUTROPHILS NFR BLD AUTO: 71.7 % — SIGNIFICANT CHANGE UP (ref 42.2–75.2)
NRBC # BLD: 0 /100 WBCS — SIGNIFICANT CHANGE UP (ref 0–0)
PLATELET # BLD AUTO: 161 K/UL — SIGNIFICANT CHANGE UP (ref 130–400)
RBC # BLD: 2.63 M/UL — LOW (ref 4.2–5.4)
RBC # FLD: 14.1 % — SIGNIFICANT CHANGE UP (ref 11.5–14.5)
WBC # BLD: 10.94 K/UL — HIGH (ref 4.8–10.8)
WBC # FLD AUTO: 10.94 K/UL — HIGH (ref 4.8–10.8)

## 2022-09-29 RX ORDER — IBUPROFEN 200 MG
1 TABLET ORAL
Qty: 56 | Refills: 0
Start: 2022-09-29 | End: 2022-10-12

## 2022-09-29 RX ORDER — HYDROCORTISONE 1 %
1 OINTMENT (GRAM) TOPICAL
Qty: 10 | Refills: 0
Start: 2022-09-29 | End: 2022-10-08

## 2022-09-29 RX ORDER — ACETAMINOPHEN 500 MG
2 TABLET ORAL
Qty: 112 | Refills: 0
Start: 2022-09-29 | End: 2022-10-12

## 2022-09-29 RX ADMIN — Medication 600 MILLIGRAM(S): at 12:00

## 2022-09-29 RX ADMIN — Medication 600 MILLIGRAM(S): at 00:14

## 2022-09-29 RX ADMIN — Medication 975 MILLIGRAM(S): at 15:00

## 2022-09-29 RX ADMIN — Medication 975 MILLIGRAM(S): at 10:30

## 2022-09-29 RX ADMIN — Medication 975 MILLIGRAM(S): at 14:36

## 2022-09-29 RX ADMIN — Medication 600 MILLIGRAM(S): at 11:26

## 2022-09-29 RX ADMIN — Medication 600 MILLIGRAM(S): at 07:20

## 2022-09-29 RX ADMIN — Medication 600 MILLIGRAM(S): at 06:51

## 2022-09-29 RX ADMIN — Medication 975 MILLIGRAM(S): at 09:52

## 2022-09-29 NOTE — DISCHARGE NOTE OB - MEDICATION SUMMARY - MEDICATIONS TO TAKE
I will START or STAY ON the medications listed below when I get home from the hospital:    Tylenol 325 mg oral capsule  -- 2 cap(s) by mouth every 6 hours   -- Indication: For C/SECTION W/TWINS    Alivio 600 mg oral tablet  -- 1 tab(s) by mouth every 6 hours   -- Do not take this drug if you are pregnant.  It is very important that you take or use this exactly as directed.  Do not skip doses or discontinue unless directed by your doctor.  May cause drowsiness or dizziness.  Obtain medical advice before taking any non-prescription drugs as some may affect the action of this medication.  Take with food or milk.    -- Indication: For C/SECTION W/TWINS

## 2022-09-29 NOTE — DISCHARGE NOTE OB - CARE PLAN
Assessment and plan of treatment:	please follow up with Dr. Jaramillo in 1 week for incision check and 6 weeks postpartum   1 Principal Discharge DX:	 delivery delivered  Assessment and plan of treatment:	please follow up with Dr. Jaramillo in 1 week for incision check and 6 weeks postpartum

## 2022-09-29 NOTE — DISCHARGE NOTE OB - CARE PROVIDER_API CALL
Alice Sanchez  OBSTETRICS AND GYNECOLOGY  [address] [place] [address]  Phone: [phone]  Fax: [phone]  Follow Up Time:

## 2022-09-29 NOTE — DISCHARGE NOTE OB - ADDITIONAL INSTRUCTIONS
Nothing in the vagina for 6 weeks (no tampons, no intercourse, no douching). No swimming pools/tub baths. You may shower. If you have a fever >100.4F, heavy vaginal bleeding or severe abdominal pain despite medication, please call your doctor or go to the emergency room.  Follow up with your doctor in 1 week for incision check and 6 weeks for a postpartum check.

## 2022-09-29 NOTE — DISCHARGE NOTE OB - NSTOBACCONEVERSMOKERY/N_GEN_A
Vitals WDL. Baby is breastfeeding much better today. He is still fussy and wants to be held all the time. Mother able to latch baby this am with minimal help and had latched baby several times along. Mother states he seems to have it. She is requesting to be discharge this afternoon with baby.Used Breastfeeding Guide and reviewed first alert form, importance/ benefits of exclusive breastfeeding for 6 months, proper handling and storage of breast milk, and all resources available after leaving the hospital. Questions/ Concerns answered. Mother verbalized understanding. Cord clamp removed and went over cord care. Encouraged mother to make know to follow up dr that baby has possible epispadius when seen by peds at followup. Baby is stooling and voiding well. Baby is minimally jaundice. Labs reported to Dr Mcallister prior to discharge.Followup made for Monday with NNP and lactation.Encourged mother to continue to fill out BF guide and bring to clinic on Monday.Discharge paperwork done with mom awaiting on car seat's arrival. Dad returned with car seat. Instructed parents to call when they were ready to be  Wheeled out. Checked in room 20 minutes later both parents and baby had left on own without being escorted out.     No

## 2022-09-29 NOTE — PROGRESS NOTE ADULT - ASSESSMENT
28yo now P2 S/P LTCS for di/di twins POD#2, EBL 1200 with extra 600cc postpartum, s/p metherginex1 and 1000mg cytotec, continuing to recover.    - encourage ambulation  - PO hydration   - Continue Diet as tolerated  - DVT ppx: SCDs and Lovenox   - Incentive Spirometry bedside and encouraged   - f/u AM CBC   - Vital signs [id number]   - voided  - circ to be done today    Dr. Joy and Dr. Ham to be made aware    
A/P: 27y.o. now P2, s/p primary LTCS for di/di twin with FGR twin A and GDMA1, s/p methergine for 250cc of post-operative vaginal bleeding, recovering well     - pain management with PO pain meds   - monitor vitals/bleeding   - encourage incentive spirometry   - ambulation/PO hydration  - advance diet as tolerated   - simethicone  - SCDs/lovenox for DVT prophylaxis   - f/u AM labs   - routine postop care     Dr. Joy and Dr. Jaramillo to be made aware. 
26yo now P2 S/P LTCS for di/di twins POD#1, EBL 1200 with extra 600cc postpartum, s/p metherginex1 and 1000mg cytotec, continuing to recover.    - encourage ambulation  - PO hydration   - Continue Diet as tolerated  - DVT ppx: SCDs and Lovenox   - Incentive Spirometry bedside and encouraged   - f/u AM CBC   - Vital signs [id number]   - Mcclellan catheter discontinued, TOV BY: 1230    Dr. Joy and Dr. Garibay to be made aware

## 2022-09-29 NOTE — DISCHARGE NOTE OB - HOSPITAL COURSE
22 @ 06:35    HPI:  Pt is a 27y.o.  @ 37.1wks Di/ Di Twin gestation presents for Primary , Twin A FGR 4%. Pregnancy complicated by GDM A1, Diet Controlled. Pt denies Ctx, LOF or VB and reports good FM x 2 (27 Sep 2022 06:51)      PAST MEDICAL & SURGICAL HISTORY:  No pertinent past medical history      History of tonsillectomy          POST PARTUM COURSE:          LABS:                        7.6    10.20 )-----------( 158      ( 28 Sep 2022 18:29 )             22.3                         8.0    10.91 )-----------( 134      ( 28 Sep 2022 09:42 )             23.3                         8.7    14.45 )-----------( 159      ( 27 Sep 2022 21:41 )             25.2                         10.6   9.87  )-----------( 159      ( 27 Sep 2022 12:14 )             31.0                         12.2   8.66  )-----------( 176      ( 27 Sep 2022 06:58 )             34.9       22 @ 12:14      138  |  105  |  5<L>  ----------------------------<  90  3.9   |  25  |  <0.5<L>        Ca    8.7      27 Sep 2022 12:14          Allergies    aspirin (Unknown)    Intolerances

## 2022-09-29 NOTE — DISCHARGE NOTE OB - PATIENT PORTAL LINK FT
You can access the FollowMyHealth Patient Portal offered by [facility] by registering at the following website: [email or web address]. By joining [employer]’s FollowMyHealth portal, you will also be able to view your health information using other applications (apps) compatible with our system.

## 2022-09-29 NOTE — DISCHARGE NOTE OB - NS MD DC FALL RISK RISK
For information on Fall & Injury Prevention, visit: [email or web address] OR  [email or web address] OR  https://www.cdc.gov/steadi/patient.html

## 2022-09-29 NOTE — DISCHARGE NOTE OB - WEAR SUPPORTIVE BRA
Patient: Dl Raymundo    Procedure: Procedure(s):  Lumbar 2-Lumbar 4 Interbody and Posterolateral Fusion, Minimally Invasive Versus Open       Anesthesia Type:  General    Note:     Postop Pain Control: Uneventful            Sign Out: Well controlled pain   PONV: No   Neuro/Psych: Uneventful            Sign Out: Acceptable/Baseline neuro status   Airway/Respiratory: Uneventful            Sign Out: Acceptable/Baseline resp. status   CV/Hemodynamics: Uneventful            Sign Out: Acceptable CV status   Other NRE: NONE   DID A NON-ROUTINE EVENT OCCUR? No           Last vitals:  Vitals Value Taken Time   /85 06/16/22 1315   Temp 97.2  F (36.2  C) 06/16/22 1030   Pulse 79 06/16/22 1323   Resp 35 06/16/22 1323   SpO2 94 % 06/16/22 1321   Vitals shown include unvalidated device data.    Electronically Signed By: Markell Mendez MD  June 16, 2022  1:36 PM  
Statement Selected

## 2022-09-29 NOTE — PROGRESS NOTE ADULT - SUBJECTIVE AND OBJECTIVE BOX
PGY 2 Note    Chief Complaint: Post  section    HPI: Pt doing well, pain well controlled. No overnight events, no acute complaints. She has been ambulating, voiding, passing gas, and tolerating regular diet without difficulty. Denies dizziness, lightheadedness, palpitations, nausea, vomiting or heavy vaginal bleeding.    ROS: Denies cardiovascular or respiratory symptoms    PAST MEDICAL & SURGICAL HISTORY:  No pertinent past medical history    History of tonsillectomy    Physical Exam  Vital Signs Last 24 Hrs  T(F): 98 (29 Sep 2022 04:19), Max: 98.4 (28 Sep 2022 15:50)  HR: 80 (29 Sep 2022 04:19) (80 - 92)  BP: 102/58 (29 Sep 2022 04:19) (91/54 - 106/59)  RR: 18 (29 Sep 2022 04:19) (16 - 18)    Physical exam:  General - AAOx3, NAD  Heart - S1S2, RRR  Lungs - CTA BL  Abdomen:  - Soft, appropriately tender, mildly distended, BS+. Clean, dry, intact steri strips in place over pfannenstiel skin incision.  - Fundus firm, appropriately tender, below the umbilicus  Pelvis/Vagina - Normal Lochia  Extremities - No calf tenderness, no swelling    Labs:                        7.6    10.20 )-----------( 158      ( 28 Sep 2022 18:29 )             22.3                         8.0    10.91 )-----------( 134      ( 28 Sep 2022 09:42 )             23.3     Antibody Screen: NEG (22 @ 06:58)

## 2022-10-03 ENCOUNTER — APPOINTMENT (OUTPATIENT)
Dept: ANTEPARTUM | Facility: CLINIC | Age: 27
End: 2022-10-03

## 2022-10-06 ENCOUNTER — APPOINTMENT (OUTPATIENT)
Dept: ANTEPARTUM | Facility: CLINIC | Age: 27
End: 2022-10-06

## 2022-10-06 DIAGNOSIS — Z3A.37 37 WEEKS GESTATION OF PREGNANCY: ICD-10-CM

## 2022-10-06 DIAGNOSIS — O30.043 TWIN PREGNANCY, DICHORIONIC/DIAMNIOTIC, THIRD TRIMESTER: ICD-10-CM

## 2022-10-11 ENCOUNTER — APPOINTMENT (OUTPATIENT)
Dept: ANTEPARTUM | Facility: CLINIC | Age: 27
End: 2022-10-11

## 2022-10-13 ENCOUNTER — APPOINTMENT (OUTPATIENT)
Dept: ANTEPARTUM | Facility: CLINIC | Age: 27
End: 2022-10-13

## 2022-10-17 ENCOUNTER — APPOINTMENT (OUTPATIENT)
Dept: ANTEPARTUM | Facility: CLINIC | Age: 27
End: 2022-10-17

## 2022-10-20 ENCOUNTER — APPOINTMENT (OUTPATIENT)
Dept: ANTEPARTUM | Facility: CLINIC | Age: 27
End: 2022-10-20

## 2023-04-21 LAB
BILIRUB UR QL STRIP: NORMAL
CLARITY UR: CLEAR
COLLECTION METHOD: NORMAL
GLUCOSE BLDC GLUCOMTR-MCNC: 96
GLUCOSE UR-MCNC: NORMAL
HCG UR QL: 0.2 EU/DL
HGB UR QL STRIP.AUTO: NORMAL
KETONES UR-MCNC: NORMAL
LEUKOCYTE ESTERASE UR QL STRIP: NORMAL
NITRITE UR QL STRIP: NORMAL
PH UR STRIP: 6.5
PROT UR STRIP-MCNC: NORMAL
SP GR UR STRIP: 1

## 2023-06-19 NOTE — OB PROVIDER DELIVERY SUMMARY - NS_PLACENTA_OBGYN_ALL_OB_DT
27-Sep-2022 09:55 High Dose Vitamin A Counseling: Side effects reviewed, pt to contact office should one occur.

## 2024-10-18 NOTE — PROCEDURAL SAFETY CHECKLIST WITH OR WITHOUT SEDATION - NSSTERILITYCONFIRMD_GEN_ALL_CORE
Caller: [facility] - [place], [place] - [address] [place], [place] [phone] [facility] [phone] FX    Relationship: Pharmacy  SPOKE WITH TIA    Best call back number: [phone]     Requested Prescriptions: PLEASE SEND BY ESCRIBE  isosorbide mononitrate (IMDUR) 30 MG 24 hr tablet   Requested Prescriptions      No prescriptions requested or ordered in this encounter        Pharmacy where request should be sent: [facility] - [place], [place] - [address] [place], [place] [phone] [facility] [phone] FX     Last office visit with prescribing clinician: Visit date not found   Last telemedicine visit with prescribing clinician: Visit date not found   Next office visit with prescribing clinician: 12/31/2024     Additional details provided by patient: A FAX REQUEST WAS SENT 10/07/24 WITH NO RESPONSE  SO PLEASE ESCRIBE    Does the patient have less than a 3 day supply:  [] Yes  [x] No    Would you like a call back once the refill request has been completed: [] Yes  No  [x]  If the office needs to give you a call back, can they leave a voicemail: [] Yes [x] No    Hernán Short Rep   10/18/24 11:26 CDT         
done

## 2025-02-06 PROBLEM — Z78.9 OTHER SPECIFIED HEALTH STATUS: Chronic | Status: ACTIVE | Noted: 2022-09-27

## 2025-02-20 ENCOUNTER — APPOINTMENT (OUTPATIENT)
Dept: OTOLARYNGOLOGY | Facility: CLINIC | Age: 30
End: 2025-02-20
Payer: COMMERCIAL

## 2025-02-20 VITALS — WEIGHT: 142 LBS | HEIGHT: 64 IN | BODY MASS INDEX: 24.24 KG/M2

## 2025-02-20 DIAGNOSIS — H93.8X9 OTHER SPECIFIED DISORDERS OF EAR, UNSPECIFIED EAR: ICD-10-CM

## 2025-02-20 PROCEDURE — 99204 OFFICE O/P NEW MOD 45 MIN: CPT

## 2025-02-26 ENCOUNTER — OUTPATIENT (OUTPATIENT)
Dept: OUTPATIENT SERVICES | Facility: HOSPITAL | Age: 30
LOS: 1 days | End: 2025-02-26
Payer: COMMERCIAL

## 2025-02-26 DIAGNOSIS — Z00.00 ENCOUNTER FOR GENERAL ADULT MEDICAL EXAMINATION WITHOUT ABNORMAL FINDINGS: ICD-10-CM

## 2025-02-26 DIAGNOSIS — Z90.89 ACQUIRED ABSENCE OF OTHER ORGANS: Chronic | ICD-10-CM

## 2025-02-26 LAB
25(OH)D3 SERPL-MCNC: 18 NG/ML
ALBUMIN SERPL ELPH-MCNC: 5 G/DL
ALP BLD-CCNC: 73 U/L
ALT SERPL-CCNC: 16 U/L
ANION GAP SERPL CALC-SCNC: 15 MMOL/L
AST SERPL-CCNC: 16 U/L
BASOPHILS # BLD AUTO: 0.03 K/UL
BASOPHILS NFR BLD AUTO: 0.6 %
BILIRUB SERPL-MCNC: 0.3 MG/DL
BUN SERPL-MCNC: 13 MG/DL
CALCIUM SERPL-MCNC: 9.9 MG/DL
CHLORIDE SERPL-SCNC: 105 MMOL/L
CHOLEST SERPL-MCNC: 173 MG/DL
CO2 SERPL-SCNC: 21 MMOL/L
CREAT SERPL-MCNC: 0.8 MG/DL
EGFR: 102 ML/MIN/1.73M2
EOSINOPHIL # BLD AUTO: 0.07 K/UL
EOSINOPHIL NFR BLD AUTO: 1.3 %
ESTIMATED AVERAGE GLUCOSE: 103 MG/DL
GLUCOSE SERPL-MCNC: 104 MG/DL
HBA1C MFR BLD HPLC: 5.2 %
HCT VFR BLD CALC: 39.2 %
HDLC SERPL-MCNC: 54 MG/DL
HGB BLD-MCNC: 12.7 G/DL
IMM GRANULOCYTES NFR BLD AUTO: 0.2 %
LDLC SERPL CALC-MCNC: 107 MG/DL
LYMPHOCYTES # BLD AUTO: 2.25 K/UL
LYMPHOCYTES NFR BLD AUTO: 41.7 %
MAN DIFF?: NORMAL
MCHC RBC-ENTMCNC: 26 PG
MCHC RBC-ENTMCNC: 32.4 G/DL
MCV RBC AUTO: 80.2 FL
MONOCYTES # BLD AUTO: 0.37 K/UL
MONOCYTES NFR BLD AUTO: 6.9 %
NEUTROPHILS # BLD AUTO: 2.67 K/UL
NEUTROPHILS NFR BLD AUTO: 49.3 %
NONHDLC SERPL-MCNC: 119 MG/DL
PLATELET # BLD AUTO: 222 K/UL
PMV BLD AUTO: 0 /100 WBCS
PMV BLD: 11.5 FL
POTASSIUM SERPL-SCNC: 4.6 MMOL/L
PROT SERPL-MCNC: 8 G/DL
RBC # BLD: 4.89 M/UL
RBC # FLD: 13.5 %
SODIUM SERPL-SCNC: 141 MMOL/L
TRIGL SERPL-MCNC: 64 MG/DL
TSH SERPL-ACNC: 1.33 UIU/ML
WBC # FLD AUTO: 5.4 K/UL

## 2025-02-26 PROCEDURE — 82306 VITAMIN D 25 HYDROXY: CPT

## 2025-02-26 PROCEDURE — 83036 HEMOGLOBIN GLYCOSYLATED A1C: CPT

## 2025-02-26 PROCEDURE — 80061 LIPID PANEL: CPT

## 2025-02-26 PROCEDURE — 84443 ASSAY THYROID STIM HORMONE: CPT

## 2025-02-26 PROCEDURE — 80053 COMPREHEN METABOLIC PANEL: CPT

## 2025-02-26 PROCEDURE — 85025 COMPLETE CBC W/AUTO DIFF WBC: CPT

## 2025-02-27 DIAGNOSIS — Z00.00 ENCOUNTER FOR GENERAL ADULT MEDICAL EXAMINATION WITHOUT ABNORMAL FINDINGS: ICD-10-CM

## 2025-03-04 ENCOUNTER — APPOINTMENT (OUTPATIENT)
Dept: OTOLARYNGOLOGY | Facility: CLINIC | Age: 30
End: 2025-03-04
Payer: COMMERCIAL

## 2025-03-04 DIAGNOSIS — H93.8X9 OTHER SPECIFIED DISORDERS OF EAR, UNSPECIFIED EAR: ICD-10-CM

## 2025-03-04 PROCEDURE — 69145 REMOVE EAR CANAL LESION(S): CPT | Mod: RT

## 2025-03-06 LAB — CORE LAB BIOPSY: NORMAL

## 2025-03-10 ENCOUNTER — APPOINTMENT (OUTPATIENT)
Dept: OTOLARYNGOLOGY | Facility: CLINIC | Age: 30
End: 2025-03-10